# Patient Record
Sex: FEMALE | Race: BLACK OR AFRICAN AMERICAN | NOT HISPANIC OR LATINO | Employment: UNEMPLOYED
[De-identification: names, ages, dates, MRNs, and addresses within clinical notes are randomized per-mention and may not be internally consistent; named-entity substitution may affect disease eponyms.]

---

## 2024-01-12 ENCOUNTER — TRANSCRIBE ORDERS (OUTPATIENT)
Dept: OTHER | Age: 29
End: 2024-01-12

## 2024-01-12 DIAGNOSIS — B19.10 HEPATITIS B INFECTION WITHOUT DELTA AGENT WITHOUT HEPATIC COMA, UNSPECIFIED CHRONICITY: Primary | ICD-10-CM

## 2024-01-17 NOTE — CONFIDENTIAL NOTE
DIAGNOSIS:  Hepatitis B infection without delta agent without hepatic coma, unspecified chronicity    Appt Date:  03.01.2024   NOTES STATUS DETAILS   OFFICE NOTE from referring provider Care Everywhere 01.11.2024  Brandie Broussard CNM [facility]   OFFICE NOTES from other specialists     DISCHARGE SUMMARY from hospital     MEDICATION LIST Care Everywhere    LIVER BIOSPY (IF APPLICABLE)      PATHOLOGY REPORTS      IMAGING     ENDOSCOPY (IF AVAILABLE)     COLONOSCOPY (IF AVAILABLE)     ULTRASOUND LIVER     CT OF ABDOMEN     MRI OF LIVER     FIBROSCAN, US ELASTOGRAPHY, FIBROSIS SCAN, MR ELASTOGRAPHY     LABS     HEPATIC PANEL (LIVER PANEL) Care Everywhere 12.02.2023   BASIC METABOLIC PANEL Care Everywhere 07.06.2023   COMPLETE METABOLIC PANEL Care Everywhere 01.03.2024   COMPLETE BLOOD COUNT (CBC) Care Everywhere 01.03.2024   INTERNATIONAL NORMALIZED RATIO (INR)     HEPATITIS C ANTIBODY Care Everywhere 01.03.2024   HEPATITIS C VIRAL LOAD/PCR     HEPATITIS C GENOTYPE     HEPATITIS B SURFACE ANTIGEN Care Everywhere 01.03.2024   HEPATITIS B SURFACE ANTIBODY Care Everywhere 01.03.2024   HEPATITIS B DNA QUANT LEVEL     HEPATITIS B CORE ANTIBODY Care Everywhere 01.03.2024

## 2024-02-22 DIAGNOSIS — B19.10 HEPATITIS B VIRUS INFECTION, UNSPECIFIED CHRONICITY: Primary | ICD-10-CM

## 2024-03-01 ENCOUNTER — PRE VISIT (OUTPATIENT)
Dept: GASTROENTEROLOGY | Facility: CLINIC | Age: 29
End: 2024-03-01

## 2024-03-01 ENCOUNTER — OFFICE VISIT (OUTPATIENT)
Dept: GASTROENTEROLOGY | Facility: CLINIC | Age: 29
End: 2024-03-01
Attending: PHYSICIAN ASSISTANT
Payer: COMMERCIAL

## 2024-03-01 ENCOUNTER — LAB (OUTPATIENT)
Dept: LAB | Facility: CLINIC | Age: 29
End: 2024-03-01
Attending: PHYSICIAN ASSISTANT
Payer: COMMERCIAL

## 2024-03-01 VITALS
OXYGEN SATURATION: 98 % | SYSTOLIC BLOOD PRESSURE: 113 MMHG | WEIGHT: 151 LBS | DIASTOLIC BLOOD PRESSURE: 77 MMHG | HEART RATE: 77 BPM

## 2024-03-01 DIAGNOSIS — Z71.1 CONCERN ABOUT SKIN DISEASE WITHOUT DIAGNOSIS: ICD-10-CM

## 2024-03-01 DIAGNOSIS — B19.10 HEPATITIS B VIRUS INFECTION, UNSPECIFIED CHRONICITY: ICD-10-CM

## 2024-03-01 DIAGNOSIS — B19.10 HEPATITIS B INFECTION WITHOUT DELTA AGENT WITHOUT HEPATIC COMA, UNSPECIFIED CHRONICITY: Primary | ICD-10-CM

## 2024-03-01 DIAGNOSIS — Z86.19 HISTORY OF HELICOBACTER PYLORI INFECTION: ICD-10-CM

## 2024-03-01 DIAGNOSIS — R63.0 POOR APPETITE: ICD-10-CM

## 2024-03-01 DIAGNOSIS — R10.11 RUQ ABDOMINAL PAIN: ICD-10-CM

## 2024-03-01 DIAGNOSIS — B19.10 HEPATITIS B VIRUS INFECTION, UNSPECIFIED CHRONICITY: Primary | ICD-10-CM

## 2024-03-01 PROBLEM — A04.8 HELICOBACTER PYLORI INFECTION: Status: ACTIVE | Noted: 2024-01-03

## 2024-03-01 PROBLEM — E87.6 LOW SERUM POTASSIUM: Status: ACTIVE | Noted: 2024-01-11

## 2024-03-01 PROBLEM — N28.1 RENAL CYST, RIGHT: Status: ACTIVE | Noted: 2023-07-17

## 2024-03-01 PROBLEM — Z28.39 IMMUNIZATION DEFICIENCY: Status: ACTIVE | Noted: 2024-01-11

## 2024-03-01 PROBLEM — M54.42 CHRONIC BILATERAL LOW BACK PAIN WITH BILATERAL SCIATICA: Status: ACTIVE | Noted: 2024-01-03

## 2024-03-01 PROBLEM — N90.810 FEMALE CIRCUMCISION: Status: ACTIVE | Noted: 2023-07-17

## 2024-03-01 PROBLEM — E55.9 VITAMIN D DEFICIENCY: Status: ACTIVE | Noted: 2024-01-11

## 2024-03-01 PROBLEM — R10.13 EPIGASTRIC PAIN: Status: ACTIVE | Noted: 2024-01-03

## 2024-03-01 PROBLEM — R11.0 NAUSEA: Status: ACTIVE | Noted: 2024-01-11

## 2024-03-01 PROBLEM — M54.41 CHRONIC BILATERAL LOW BACK PAIN WITH BILATERAL SCIATICA: Status: ACTIVE | Noted: 2024-01-03

## 2024-03-01 PROBLEM — E66.3 OVERWEIGHT: Status: ACTIVE | Noted: 2023-07-17

## 2024-03-01 PROBLEM — G89.29 CHRONIC BILATERAL LOW BACK PAIN WITH BILATERAL SCIATICA: Status: ACTIVE | Noted: 2024-01-03

## 2024-03-01 PROBLEM — Z60.5: Status: ACTIVE | Noted: 2023-07-17

## 2024-03-01 PROBLEM — N91.5 LIGHT MENSTRUAL FLOW: Status: ACTIVE | Noted: 2024-01-03

## 2024-03-01 PROBLEM — H70.93 MASTOIDITIS OF BOTH SIDES: Status: ACTIVE | Noted: 2024-01-11

## 2024-03-01 PROBLEM — D22.9 ATYPICAL MOLE: Status: ACTIVE | Noted: 2024-01-03

## 2024-03-01 PROBLEM — G47.00 INSOMNIA: Status: ACTIVE | Noted: 2024-01-11

## 2024-03-01 PROBLEM — R21 RASH: Status: ACTIVE | Noted: 2024-01-03

## 2024-03-01 LAB
ALBUMIN SERPL BCG-MCNC: 3.8 G/DL (ref 3.5–5.2)
ALP SERPL-CCNC: 96 U/L (ref 40–150)
ALT SERPL W P-5'-P-CCNC: 33 U/L (ref 0–50)
ANION GAP SERPL CALCULATED.3IONS-SCNC: 11 MMOL/L (ref 7–15)
AST SERPL W P-5'-P-CCNC: 23 U/L (ref 0–45)
BILIRUB DIRECT SERPL-MCNC: <0.2 MG/DL (ref 0–0.3)
BILIRUB SERPL-MCNC: 0.5 MG/DL
BUN SERPL-MCNC: 6.9 MG/DL (ref 6–20)
CALCIUM SERPL-MCNC: 8.9 MG/DL (ref 8.6–10)
CHLORIDE SERPL-SCNC: 104 MMOL/L (ref 98–107)
CREAT SERPL-MCNC: 0.46 MG/DL (ref 0.51–0.95)
DEPRECATED HCO3 PLAS-SCNC: 23 MMOL/L (ref 22–29)
EGFRCR SERPLBLD CKD-EPI 2021: >90 ML/MIN/1.73M2
ERYTHROCYTE [DISTWIDTH] IN BLOOD BY AUTOMATED COUNT: 11.9 % (ref 10–15)
GLUCOSE SERPL-MCNC: 86 MG/DL (ref 70–99)
HCT VFR BLD AUTO: 36.2 % (ref 35–47)
HGB BLD-MCNC: 12.5 G/DL (ref 11.7–15.7)
INR PPP: 1.06 (ref 0.85–1.15)
MCH RBC QN AUTO: 31 PG (ref 26.5–33)
MCHC RBC AUTO-ENTMCNC: 34.5 G/DL (ref 31.5–36.5)
MCV RBC AUTO: 90 FL (ref 78–100)
PLATELET # BLD AUTO: 338 10E3/UL (ref 150–450)
POTASSIUM SERPL-SCNC: 3.7 MMOL/L (ref 3.4–5.3)
PROT SERPL-MCNC: 7 G/DL (ref 6.4–8.3)
RBC # BLD AUTO: 4.03 10E6/UL (ref 3.8–5.2)
SODIUM SERPL-SCNC: 138 MMOL/L (ref 135–145)
WBC # BLD AUTO: 7 10E3/UL (ref 4–11)

## 2024-03-01 PROCEDURE — 87517 HEPATITIS B DNA QUANT: CPT | Performed by: PHYSICIAN ASSISTANT

## 2024-03-01 PROCEDURE — 87350 HEPATITIS BE AG IA: CPT | Mod: 90 | Performed by: PATHOLOGY

## 2024-03-01 PROCEDURE — 85027 COMPLETE CBC AUTOMATED: CPT | Performed by: PATHOLOGY

## 2024-03-01 PROCEDURE — 86707 HEPATITIS BE ANTIBODY: CPT | Mod: 90 | Performed by: PATHOLOGY

## 2024-03-01 PROCEDURE — 80053 COMPREHEN METABOLIC PANEL: CPT | Performed by: PATHOLOGY

## 2024-03-01 PROCEDURE — 99204 OFFICE O/P NEW MOD 45 MIN: CPT | Performed by: PHYSICIAN ASSISTANT

## 2024-03-01 PROCEDURE — 99000 SPECIMEN HANDLING OFFICE-LAB: CPT | Performed by: PATHOLOGY

## 2024-03-01 PROCEDURE — 85610 PROTHROMBIN TIME: CPT | Performed by: PATHOLOGY

## 2024-03-01 PROCEDURE — G0463 HOSPITAL OUTPT CLINIC VISIT: HCPCS | Performed by: PHYSICIAN ASSISTANT

## 2024-03-01 PROCEDURE — 82248 BILIRUBIN DIRECT: CPT | Performed by: PATHOLOGY

## 2024-03-01 PROCEDURE — 36415 COLL VENOUS BLD VENIPUNCTURE: CPT | Performed by: PATHOLOGY

## 2024-03-01 NOTE — NURSING NOTE
Chief Complaint   Patient presents with    Consult     Hep B     Vital signs:      BP: 113/77 Pulse: 77     SpO2: 98 %       Weight: 68.5 kg (151 lb)  There is no height or weight on file to calculate BMI.      Evon Lamb CMA   3/1/2024 10:58 AM

## 2024-03-01 NOTE — PROGRESS NOTES
Hepatology Clinic Note  Elizabeth Sam   Date of Birth 1995    REASON FOR CONSULTATION: Hepatitis B  REFERRING PROVIDER: Brandie Broussard CNM         Assessment/plan:     Elizabeth Sam is a 30 YO F with chronic hepatitis B, e antigen and e antibody pending.     # Chronic Hepatitis B  HBV Sag: reactive  HBV Cab total: reactive   HBV DNA: 1890 Jan 2024   > Pending today's value   HCV Ab: nonreactive   ALT/AST: 25, 26  Fibrosis Assessment: none per chart review   Liver Function: INR, PLT, Tbili, alk phos, albumin, LFTs all WNL  Diagnosed: 20212022 per pt report   Prior biopsy: none   Prior treatments: never been on HBV meds   Lives alone; not currently sexually active     US abd limited 7/16/23: Liver mildly increased in echogenicity. No masses. No intrahepatic biliary dilatation.     CT abd/pelvis w/o and w/ contrast 8/1/23: No focal suspicious hepatic mass.     - Reviewed recent imaging and labs  - HBV treatment: not indicated at this time with normal LFTs and recent low viremia    - Discussed Hepatitis B transmission, the natural course of Hepatitis B and indications for treatment. We also discussed the importance of regular labs and follow-up.   - Recommended Hepatitis B testing for other members of the household and immunization if necessary as well as any new sexual partners.     - Follow-up in clinic in 6 months w/ labs prior (hepatic panel, CBC, INR, BMP, HBV DNA quant)    > Consider fibroscan at that visit     # Intermittent RUQ pain  # Poor appetite  # Hx H pylori  # Occasional melena (hgb 12.5, BUN WNL)   # Constipation   - Ordered HIDA scan   - Referral placed to General GI   - Concerns that this could be gallbladder related despite recent normal imaging of liver   - Could also consider testing for H pylori +/- diagnostic EGD/colonoscopy     # Skin lesions (small, dark in color, scattered) noted on neck    - Referral placed to Dermatology     Laura Sanchez PA-C   [facility]  "Hepatology  -----------------------------------------------------       HPI:   Elizabeth Sam is a 28 YO F presenting for evaluation and treatment of Hepatitis B.     Patient believes she was diagnosed with hepatitis B 2021/2022.  Has never been on hepatitis B medication.  No history of liver biopsy.  States her mom has a history of hepatitis B.  Patient lives alone.  Is not currently sexually active.    States the only current prescription medication she takes as a sleep aid nightly.  States that was prescribed by a provider at an outside facility.  She is unsure of the name of the medication.  Occasionally takes vitamin D and B12.  States she used to take over-the-counter pain medication as needed from [employer] for headache.    States she has random right-sided abdominal pain that feels sharp.  Pain comes and goes.  When the pain is present, she will have episodes of vomiting associated with burning upper back pain.  Admits that she has a history of H. pylori in the past that was treated with medications.  Patient states the H. pylori infection resolved.  No recent fevers or chills.  No burning in her chest.  No sour taste in her mouth.  States she has a poor appetite.  Typically drinks water or juice.  Believes she is losing weight.  Admits to history of constipation.  No bright red blood per rectum.  Occasionally sees black stool.  Also wonders about dark appearing spots that are on her neck.  Patient states the spots are not itchy or painful.  States they come and go.  This has been happening for several years.    Occasionally smokes hookah that contains tobacco.  Denies history of IV drug use.  No current alcohol or illicit drug use.    PMH:  has no past medical history on file.    SMH:  has no past surgical history on file.     Medications:   No current outpatient medications on file.     No current facility-administered medications for this visit.     Hepatitis B:   No results found for: \"HEPBANG\", \"HBCAB\", " "\"AUSAB\", \"HCVAB\", \"HCQTIINST\", \"HDAAB\"  No results found for: \"HBQRESINST\", \"HBQRES\"  No results found for: \"HBEABY\", \"HBEAGN\"         Medications:     No current outpatient medications on file.     No current facility-administered medications for this visit.          Allergies:   Not on File         Social History:     Social History     Socioeconomic History    Marital status: Single     Spouse name: Not on file    Number of children: Not on file    Years of education: Not on file    Highest education level: Not on file   Occupational History    Not on file   Tobacco Use    Smoking status: Some Days     Types: Hookah    Smokeless tobacco: Not on file   Substance and Sexual Activity    Alcohol use: Not Currently    Drug use: Not Currently    Sexual activity: Not on file   Other Topics Concern    Not on file   Social History Narrative    Not on file     Social Determinants of Health     Financial Resource Strain: Not on file   Food Insecurity: Not on file   Transportation Needs: Not on file   Physical Activity: Not on file   Stress: Not on file   Social Connections: Not on file   Interpersonal Safety: Not on file   Housing Stability: Not on file          Family History:     Family History   Problem Relation Age of Onset    Liver Disease Mother         Hepatitis B    Hypertension Father           Review of Systems:   GEN: See HPI         Physical Exam:     Vital signs:      BP: 113/77 Pulse: 77     SpO2: 98 %       Weight: 68.5 kg (151 lb)  There is no height or weight on file to calculate BMI.  Gen: A&Ox3, NAD, well developed  HEENT: non-icteric   CV: RRR, no overt murmurs  Lung: CTA anteriorly, no wheezing or crackles.   Abd: soft, mild TTP in LLQ and RLQ, ND, no palpable splenomegaly, liver is not palpable.   Ext: no edema, intact pulses.   Skin: Small dark scattered spots noted on neck area   Neuro: grossly intact  Psych: appropriate mood and affects    IMAGING:     ED US ABDOMINAL/GALLBLADDER " 12/3/23    Indications: Abdominal pain    Window: Longitudinal and Transverse    Findings: No gall stones identified, No anterior gallbladder wall  thickening identified, No pericholecystic fluid identified, No CBD  dilation identified, and Negative sonographic Bray's sign    Impression: No sonographic evidence of acute biliary pathology identified    Homer Casillas MD, 12/3/2023 2:28 PM      CT ABDOMEN W/O +W/ + PELVIS W/IV CON 8/1/23    Impression: No suspicious renal mass. Cholelithiasis without CT evidence of acute cholecystitis.    Reading Radiologist: Chavez Logno  Narrative    Comparison: None    Indication: Renal mass    Technique: Volumetric helical acquisition of CT images from the lung bases through the symphysis pubis before and after the administration of intravenous contrast. Renal mass protocol was utilized.    DOSE:    Total DLP = 1454 mGy.cm.      Findings: Mild dependent atelectasis. Normal heart size without pericardial effusion. No pleural effusion. No pneumothorax.    Cholelithiasis without CT evidence of acute cholecystitis. No focal suspicious hepatic mass. The pancreas, spleen, adrenal glands, and kidneys are within normal limits. No nephrolithiasis, hydronephrosis or hydroureter on either side. No suspicious renal mass. No urinary bladder stone. No bowel obstruction. Normal appendix. No free air in the abdomen. No free fluid in the pelvis.    Aorta and major branches are patent without aneurysm or significant stenosis. Portal vein and superior mesenteric vein are patent.    No focal suspicious osseous lesion identified.        US Abdomen Limited Portable 7/16/23    Moderately distended appearing gallbladder without evidence of cholelithiasis or acute cholecystitis. Mildly prominent common bile duct measuring 6.8 millimeters.    Dictated by Scooter Matias MD @ 7/16/2023 8:01:20 AM    For Patients:  As a result of the 21st Century Cures Act, medical imaging exams and procedure reports  are released immediately into your electronic medical record.  You may view this report before your referring provider.  If you have questions, please contact your health care provider.    INDICATION:  Right upper quadrant abdomen pain    TECHNIQUE:  Ultrasound abdomen limited.  Sonographic images of the right upper quadrant were obtained using gray-scale and color Doppler images.    COMPARISON:  None    FINDINGS:  Liver: Mildly increased in echogenicity. No masses.  No intrahepatic biliary dilatation.      Gallbladder: There is a distended appearing gallbladder without evidence of radiopaque calculus, gallbladder wall thickening or pericholecystic fluid. There is no sonographic tenderness..    Common bile duct: 6.8 mm.      Pancreas: Normal.      Right kidney: Normal in size.  Normal echotexture and cortex.  No masses, stones, or hydronephrosis.      Vasculature: Proximal abdominal aorta and IVC are normal.

## 2024-03-01 NOTE — LETTER
3/1/2024         RE: Elizabeth Sam  [address]  [place] [address]        Dear Colleague,    Thank you for referring your patient, Elizabeth Sam, to the [facility] [place]. Please see a copy of my visit note below.    Hepatology Clinic Note  Elizabeth Sam   Date of Birth 1995    REASON FOR CONSULTATION: Hepatitis B  REFERRING PROVIDER: Brandie Broussard CNM         Assessment/plan:     Elizabeth Sam is a 28 YO F with chronic hepatitis B, e antigen and e antibody pending.     # Chronic Hepatitis B  HBV Sag: reactive  HBV Cab total: reactive   HBV DNA: 1890 Jan 2024   > Pending today's value   HCV Ab: nonreactive   ALT/AST: 25, 26  Fibrosis Assessment: none per chart review   Liver Function: INR, PLT, Tbili, alk phos, albumin, LFTs all WNL  Diagnosed: 20212022 per pt report   Prior biopsy: none   Prior treatments: never been on HBV meds   Lives alone; not currently sexually active     US abd limited 7/16/23: Liver mildly increased in echogenicity. No masses. No intrahepatic biliary dilatation.     CT abd/pelvis w/o and w/ contrast 8/1/23: No focal suspicious hepatic mass.     - Reviewed recent imaging and labs  - HBV treatment: not indicated at this time with normal LFTs and recent low viremia    - Discussed Hepatitis B transmission, the natural course of Hepatitis B and indications for treatment. We also discussed the importance of regular labs and follow-up.   - Recommended Hepatitis B testing for other members of the household and immunization if necessary as well as any new sexual partners.     - Follow-up in clinic in 6 months w/ labs prior (hepatic panel, CBC, INR, BMP, HBV DNA quant)    > Consider fibroscan at that visit     # Intermittent RUQ pain  # Poor appetite  # Hx H pylori  # Occasional melena (hgb 12.5, BUN WNL)   # Constipation   - Ordered HIDA scan   - Referral placed to General GI   - Concerns that this could be gallbladder related despite recent  normal imaging of liver   - Could also consider testing for H pylori +/- diagnostic EGD/colonoscopy     # Skin lesions (small, dark in color, scattered) noted on neck    - Referral placed to Dermatology     Laura Sanchez PA-C   [facility]  -----------------------------------------------------       HPI:   Elizabeth Sam is a 30 YO F presenting for evaluation and treatment of Hepatitis B.     Patient believes she was diagnosed with hepatitis B 2021/2022.  Has never been on hepatitis B medication.  No history of liver biopsy.  States her mom has a history of hepatitis B.  Patient lives alone.  Is not currently sexually active.    States the only current prescription medication she takes as a sleep aid nightly.  States that was prescribed by a provider at an outside facility.  She is unsure of the name of the medication.  Occasionally takes vitamin D and B12.  States she used to take over-the-counter pain medication as needed from [facility] for headache.    States she has random right-sided abdominal pain that feels sharp.  Pain comes and goes.  When the pain is present, she will have episodes of vomiting associated with burning upper back pain.  Admits that she has a history of H. pylori in the past that was treated with medications.  Patient states the H. pylori infection resolved.  No recent fevers or chills.  No burning in her chest.  No sour taste in her mouth.  States she has a poor appetite.  Typically drinks water or juice.  Believes she is losing weight.  Admits to history of constipation.  No bright red blood per rectum.  Occasionally sees black stool.  Also wonders about dark appearing spots that are on her neck.  Patient states the spots are not itchy or painful.  States they come and go.  This has been happening for several years.    Occasionally smokes hookah that contains tobacco.  Denies history of IV drug use.  No current alcohol or illicit drug use.    PMH:  has no past  "medical history on file.    SM:  has no past surgical history on file.     Medications:   No current outpatient medications on file.     No current facility-administered medications for this visit.     Hepatitis B:   No results found for: \"HEPBANG\", \"HBCAB\", \"AUSAB\", \"HCVAB\", \"HCQTIINST\", \"HDAAB\"  No results found for: \"HBQRESINST\", \"HBQRES\"  No results found for: \"HBEABY\", \"HBEAGN\"         Medications:     No current outpatient medications on file.     No current facility-administered medications for this visit.          Allergies:   Not on File         Social History:     Social History     Socioeconomic History     Marital status: Single     Spouse name: Not on file     Number of children: Not on file     Years of education: Not on file     Highest education level: Not on file   Occupational History     Not on file   Tobacco Use     Smoking status: Some Days     Types: Hookah     Smokeless tobacco: Not on file   Substance and Sexual Activity     Alcohol use: Not Currently     Drug use: Not Currently     Sexual activity: Not on file   Other Topics Concern     Not on file   Social History Narrative     Not on file     Social Determinants of Health     Financial Resource Strain: Not on file   Food Insecurity: Not on file   Transportation Needs: Not on file   Physical Activity: Not on file   Stress: Not on file   Social Connections: Not on file   Interpersonal Safety: Not on file   Housing Stability: Not on file          Family History:     Family History   Problem Relation Age of Onset     Liver Disease Mother         Hepatitis B     Hypertension Father           Review of Systems:   GEN: See HPI         Physical Exam:     Vital signs:      BP: 113/77 Pulse: 77     SpO2: 98 %       Weight: 68.5 kg (151 lb)  There is no height or weight on file to calculate BMI.  Gen: A&Ox3, NAD, well developed  HEENT: non-icteric   CV: RRR, no overt murmurs  Lung: CTA anteriorly, no wheezing or crackles.   Abd: soft, mild TTP in " LLQ and RLQ, ND, no palpable splenomegaly, liver is not palpable.   Ext: no edema, intact pulses.   Skin: Small dark scattered spots noted on neck area   Neuro: grossly intact  Psych: appropriate mood and affects    IMAGING:     ED US ABDOMINAL/GALLBLADDER 12/3/23    Indications: Abdominal pain    Window: Longitudinal and Transverse    Findings: No gall stones identified, No anterior gallbladder wall  thickening identified, No pericholecystic fluid identified, No CBD  dilation identified, and Negative sonographic Bray's sign    Impression: No sonographic evidence of acute biliary pathology identified    Homer Casillas MD, 12/3/2023 2:28 PM      CT ABDOMEN W/O +W/ + PELVIS W/IV CON 8/1/23    Impression: No suspicious renal mass. Cholelithiasis without CT evidence of acute cholecystitis.    Reading Radiologist: Chavez Longo  Narrative    Comparison: None    Indication: Renal mass    Technique: Volumetric helical acquisition of CT images from the lung bases through the symphysis pubis before and after the administration of intravenous contrast. Renal mass protocol was utilized.    DOSE:    Total DLP = 1454 mGy.cm.      Findings: Mild dependent atelectasis. Normal heart size without pericardial effusion. No pleural effusion. No pneumothorax.    Cholelithiasis without CT evidence of acute cholecystitis. No focal suspicious hepatic mass. The pancreas, spleen, adrenal glands, and kidneys are within normal limits. No nephrolithiasis, hydronephrosis or hydroureter on either side. No suspicious renal mass. No urinary bladder stone. No bowel obstruction. Normal appendix. No free air in the abdomen. No free fluid in the pelvis.    Aorta and major branches are patent without aneurysm or significant stenosis. Portal vein and superior mesenteric vein are patent.    No focal suspicious osseous lesion identified.        US Abdomen Limited Portable 7/16/23    Moderately distended appearing gallbladder without evidence of  cholelithiasis or acute cholecystitis. Mildly prominent common bile duct measuring 6.8 millimeters.    Dictated by Scooter Matias MD @ 7/16/2023 8:01:20 AM    For Patients:  As a result of the 21st Century Cures Act, medical imaging exams and procedure reports are released immediately into your electronic medical record.  You may view this report before your referring provider.  If you have questions, please contact your health care provider.    INDICATION:  Right upper quadrant abdomen pain    TECHNIQUE:  Ultrasound abdomen limited.  Sonographic images of the right upper quadrant were obtained using gray-scale and color Doppler images.    COMPARISON:  None    FINDINGS:  Liver: Mildly increased in echogenicity. No masses.  No intrahepatic biliary dilatation.      Gallbladder: There is a distended appearing gallbladder without evidence of radiopaque calculus, gallbladder wall thickening or pericholecystic fluid. There is no sonographic tenderness..    Common bile duct: 6.8 mm.      Pancreas: Normal.      Right kidney: Normal in size.  Normal echotexture and cortex.  No masses, stones, or hydronephrosis.      Vasculature: Proximal abdominal aorta and IVC are normal.        Again, thank you for allowing me to participate in the care of your patient.        Sincerely,        Laura Sanchez PA-C

## 2024-03-02 LAB
HBV DNA SERPL NAA+PROBE-ACNC: 2880 IU/ML
HBV DNA SERPL NAA+PROBE-LOG IU: 3.5 {LOG_IU}/ML
HBV E AB SERPL QL IA: POSITIVE
HBV E AG SERPL QL IA: NEGATIVE

## 2024-03-04 ENCOUNTER — APPOINTMENT (OUTPATIENT)
Dept: INTERPRETER SERVICES | Facility: CLINIC | Age: 29
End: 2024-03-04
Payer: COMMERCIAL

## 2024-03-05 ENCOUNTER — TELEPHONE (OUTPATIENT)
Dept: GASTROENTEROLOGY | Facility: CLINIC | Age: 29
End: 2024-03-05
Payer: COMMERCIAL

## 2024-03-05 NOTE — TELEPHONE ENCOUNTER
Called patient per Laura Sanchez PA-C regarding lab results.  used.    Informed patient:  Normal LFTs and low virus level. Will continue off hep b med at this time. Will see her back 10/9/24.     Detailed voicemail left.    MIROSLAVA HagerN, RN, PHN  Hepatology Clinic  [facility]  [facility]

## 2024-03-13 ENCOUNTER — TELEPHONE (OUTPATIENT)
Dept: GASTROENTEROLOGY | Facility: CLINIC | Age: 29
End: 2024-03-13
Payer: COMMERCIAL

## 2024-03-13 NOTE — TELEPHONE ENCOUNTER
[facility] Call Center    Phone Message    May a detailed message be left on voicemail: Yes    Reason for Call: Other: Patient is currently scheduled on 4/26, as visit type New GI Urgent. This is outside the expected timeline for this referral. Patient has been added to the waitlist.      Scheduled per pt request at [facility] GI only, first available     Action Taken: Message routed to:  Other: GI REFERRAL TRIAGE POOL     Travel Screening: Not Applicable

## 2024-03-18 NOTE — TELEPHONE ENCOUNTER
Writer and  called and left voice message for Pt. Called Pt to help get them scheduled for a sooner GI appointment. Writer left call back number.

## 2024-03-26 NOTE — TELEPHONE ENCOUNTER
The Pt is now scheduled within the appropriate time frame of one month for urgent triaged referrals. No rescheduling is needed anymore. Closing encounter.

## 2024-04-18 NOTE — TELEPHONE ENCOUNTER
Records Requested     April 18, 2024 3:53 PM  [id number]   Facility  [facility]  Fax: [phone]    [facility]  Fax: [phone]   Outcome Urgent requests faxed to [facility] and [facility] to push images to PACS    4/22/24 12:24 PM - Second requests faxed to [facility] and [facility] to push images to PACS.     4/23/24 8:29 AM - Images received from [facility] and resolved in PACS.     4/25/24 9:00 AM - Images received from [facility] and resolved in PACS.        REFERRAL INFORMATION:  Referring Provider:  Laura Sanchez PA-C  Referring Clinic:  [facility]   Reason for Visit/Diagnosis: R10.11 (ICD-10-CM) - RUQ abdominal pain Z86.19 (ICD-10-CM) - History of Helicobacter pylori infection     FUTURE VISIT INFORMATION:  Appointment Date: 4/26/24  Appointment Time: 11:00 AM      NOTES STATUS DETAILS   OFFICE NOTE from Referring Provider Internal 3/1/24 - Hep OV with Laura Sanchez PA-C   OFFICE NOTE from Other Specialist Internal 1/24/24 -PCC OV with Brandie Broussard CNM at [facility] DISCHARGE SUMMARY/  ED VISITS Care Everywhere 12/2/23 - [facility] ED visit with Elodia Schumacher PA-C    MEDICATION LIST Internal         STOOL TESTING Care Everywhere Ova and Parasite - 1/9/24   PERTINENT LABS Care Everywhere/Internal 3/1/24 - CBCP; BMP; HFP     1/3/24 - H Pylori breath test    IMAGING (CT, MRI, EGD, MRCP, Small Bowel Follow Through/SBT, MR/CT Enterography) PACS [facility]:  US Abdomen - 12/3/23    CT Abdomen Pelvis - 8/1/23    [facility]:  US Abdomen - 7/16/23

## 2024-04-26 ENCOUNTER — PRE VISIT (OUTPATIENT)
Dept: GASTROENTEROLOGY | Facility: CLINIC | Age: 29
End: 2024-04-26

## 2024-07-05 ENCOUNTER — APPOINTMENT (OUTPATIENT)
Dept: CT IMAGING | Facility: CLINIC | Age: 29
End: 2024-07-05
Attending: EMERGENCY MEDICINE
Payer: COMMERCIAL

## 2024-07-05 ENCOUNTER — HOSPITAL ENCOUNTER (OUTPATIENT)
Facility: CLINIC | Age: 29
Setting detail: OBSERVATION
Discharge: HOME | End: 2024-07-07
Attending: EMERGENCY MEDICINE | Admitting: EMERGENCY MEDICINE
Payer: COMMERCIAL

## 2024-07-05 DIAGNOSIS — K59.00 CONSTIPATION, UNSPECIFIED CONSTIPATION TYPE: Primary | ICD-10-CM

## 2024-07-05 DIAGNOSIS — L03.213 PRESEPTAL CELLULITIS: ICD-10-CM

## 2024-07-05 DIAGNOSIS — K80.20 SYMPTOMATIC CHOLELITHIASIS: ICD-10-CM

## 2024-07-05 LAB
ALBUMIN SERPL BCG-MCNC: 3.8 G/DL (ref 3.5–5.2)
ALBUMIN UR-MCNC: 50 MG/DL
ALP SERPL-CCNC: 93 U/L (ref 40–150)
ALT SERPL W P-5'-P-CCNC: 22 U/L (ref 0–50)
ANION GAP SERPL CALCULATED.3IONS-SCNC: 11 MMOL/L (ref 7–15)
APPEARANCE UR: ABNORMAL
AST SERPL W P-5'-P-CCNC: 32 U/L (ref 0–45)
BASOPHILS # BLD AUTO: 0.1 10E3/UL (ref 0–0.2)
BASOPHILS NFR BLD AUTO: 1 %
BILIRUB SERPL-MCNC: 0.2 MG/DL
BILIRUB UR QL STRIP: NEGATIVE
BUN SERPL-MCNC: 8.8 MG/DL (ref 6–20)
CALCIUM SERPL-MCNC: 9.3 MG/DL (ref 8.6–10)
CHLORIDE SERPL-SCNC: 106 MMOL/L (ref 98–107)
COLOR UR AUTO: ABNORMAL
CREAT SERPL-MCNC: 0.99 MG/DL (ref 0.51–0.95)
CRP SERPL-MCNC: 4.69 MG/L
DEPRECATED HCO3 PLAS-SCNC: 24 MMOL/L (ref 22–29)
EGFRCR SERPLBLD CKD-EPI 2021: 79 ML/MIN/1.73M2
EOSINOPHIL # BLD AUTO: 0.1 10E3/UL (ref 0–0.7)
EOSINOPHIL NFR BLD AUTO: 2 %
ERYTHROCYTE [DISTWIDTH] IN BLOOD BY AUTOMATED COUNT: 12.7 % (ref 10–15)
GLUCOSE SERPL-MCNC: 80 MG/DL (ref 70–99)
GLUCOSE UR STRIP-MCNC: NEGATIVE MG/DL
HCG SERPL QL: NEGATIVE
HCT VFR BLD AUTO: 39.2 % (ref 35–47)
HGB BLD-MCNC: 13.1 G/DL (ref 11.7–15.7)
HGB UR QL STRIP: ABNORMAL
IMM GRANULOCYTES # BLD: 0 10E3/UL
IMM GRANULOCYTES NFR BLD: 1 %
INR PPP: 1.03 (ref 0.85–1.15)
KETONES UR STRIP-MCNC: ABNORMAL MG/DL
LACTATE SERPL-SCNC: 1.1 MMOL/L (ref 0.7–2)
LEUKOCYTE ESTERASE UR QL STRIP: ABNORMAL
LIPASE SERPL-CCNC: 39 U/L (ref 13–60)
LYMPHOCYTES # BLD AUTO: 1.7 10E3/UL (ref 0.8–5.3)
LYMPHOCYTES NFR BLD AUTO: 29 %
MCH RBC QN AUTO: 30.5 PG (ref 26.5–33)
MCHC RBC AUTO-ENTMCNC: 33.4 G/DL (ref 31.5–36.5)
MCV RBC AUTO: 91 FL (ref 78–100)
MONOCYTES # BLD AUTO: 0.4 10E3/UL (ref 0–1.3)
MONOCYTES NFR BLD AUTO: 7 %
MUCOUS THREADS #/AREA URNS LPF: PRESENT /LPF
NEUTROPHILS # BLD AUTO: 3.5 10E3/UL (ref 1.6–8.3)
NEUTROPHILS NFR BLD AUTO: 60 %
NITRATE UR QL: NEGATIVE
NRBC # BLD AUTO: 0 10E3/UL
NRBC BLD AUTO-RTO: 0 /100
PH UR STRIP: 6 [PH] (ref 5–7)
PLATELET # BLD AUTO: 305 10E3/UL (ref 150–450)
POTASSIUM SERPL-SCNC: 3.9 MMOL/L (ref 3.4–5.3)
PROT SERPL-MCNC: 7.6 G/DL (ref 6.4–8.3)
RBC # BLD AUTO: 4.3 10E6/UL (ref 3.8–5.2)
RBC URINE: >182 /HPF
SODIUM SERPL-SCNC: 141 MMOL/L (ref 135–145)
SP GR UR STRIP: 1.03 (ref 1–1.03)
UROBILINOGEN UR STRIP-MCNC: NORMAL MG/DL
WBC # BLD AUTO: 5.7 10E3/UL (ref 4–11)
WBC URINE: 3 /HPF

## 2024-07-05 PROCEDURE — 83690 ASSAY OF LIPASE: CPT | Performed by: EMERGENCY MEDICINE

## 2024-07-05 PROCEDURE — 99285 EMERGENCY DEPT VISIT HI MDM: CPT | Mod: 25 | Performed by: EMERGENCY MEDICINE

## 2024-07-05 PROCEDURE — 250N000011 HC RX IP 250 OP 636: Performed by: EMERGENCY MEDICINE

## 2024-07-05 PROCEDURE — 80053 COMPREHEN METABOLIC PANEL: CPT | Performed by: EMERGENCY MEDICINE

## 2024-07-05 PROCEDURE — 83605 ASSAY OF LACTIC ACID: CPT | Performed by: EMERGENCY MEDICINE

## 2024-07-05 PROCEDURE — 70460 CT HEAD/BRAIN W/DYE: CPT | Mod: 26 | Performed by: RADIOLOGY

## 2024-07-05 PROCEDURE — 74177 CT ABD & PELVIS W/CONTRAST: CPT

## 2024-07-05 PROCEDURE — 74177 CT ABD & PELVIS W/CONTRAST: CPT | Mod: 26 | Performed by: RADIOLOGY

## 2024-07-05 PROCEDURE — 70481 CT ORBIT/EAR/FOSSA W/DYE: CPT | Mod: 26 | Performed by: RADIOLOGY

## 2024-07-05 PROCEDURE — 85025 COMPLETE CBC W/AUTO DIFF WBC: CPT | Performed by: EMERGENCY MEDICINE

## 2024-07-05 PROCEDURE — 86140 C-REACTIVE PROTEIN: CPT | Performed by: EMERGENCY MEDICINE

## 2024-07-05 PROCEDURE — 99285 EMERGENCY DEPT VISIT HI MDM: CPT | Performed by: EMERGENCY MEDICINE

## 2024-07-05 PROCEDURE — 85610 PROTHROMBIN TIME: CPT | Performed by: EMERGENCY MEDICINE

## 2024-07-05 PROCEDURE — 70460 CT HEAD/BRAIN W/DYE: CPT

## 2024-07-05 PROCEDURE — 84703 CHORIONIC GONADOTROPIN ASSAY: CPT | Performed by: EMERGENCY MEDICINE

## 2024-07-05 PROCEDURE — 81001 URINALYSIS AUTO W/SCOPE: CPT | Performed by: EMERGENCY MEDICINE

## 2024-07-05 PROCEDURE — 70481 CT ORBIT/EAR/FOSSA W/DYE: CPT

## 2024-07-05 PROCEDURE — 36415 COLL VENOUS BLD VENIPUNCTURE: CPT | Performed by: EMERGENCY MEDICINE

## 2024-07-05 RX ORDER — IOPAMIDOL 755 MG/ML
75 INJECTION, SOLUTION INTRAVASCULAR ONCE
Status: COMPLETED | OUTPATIENT
Start: 2024-07-05 | End: 2024-07-05

## 2024-07-05 RX ADMIN — IOPAMIDOL 75 ML: 755 INJECTION, SOLUTION INTRAVENOUS at 23:39

## 2024-07-05 ASSESSMENT — ACTIVITIES OF DAILY LIVING (ADL)
ADLS_ACUITY_SCORE: 35

## 2024-07-05 ASSESSMENT — COLUMBIA-SUICIDE SEVERITY RATING SCALE - C-SSRS
6. HAVE YOU EVER DONE ANYTHING, STARTED TO DO ANYTHING, OR PREPARED TO DO ANYTHING TO END YOUR LIFE?: NO
2. HAVE YOU ACTUALLY HAD ANY THOUGHTS OF KILLING YOURSELF IN THE PAST MONTH?: NO
1. IN THE PAST MONTH, HAVE YOU WISHED YOU WERE DEAD OR WISHED YOU COULD GO TO SLEEP AND NOT WAKE UP?: NO

## 2024-07-06 ENCOUNTER — APPOINTMENT (OUTPATIENT)
Dept: ULTRASOUND IMAGING | Facility: CLINIC | Age: 29
End: 2024-07-06
Attending: EMERGENCY MEDICINE
Payer: COMMERCIAL

## 2024-07-06 PROBLEM — K80.20 SYMPTOMATIC CHOLELITHIASIS: Status: ACTIVE | Noted: 2024-07-06

## 2024-07-06 PROBLEM — L03.213 PRESEPTAL CELLULITIS: Status: ACTIVE | Noted: 2024-07-06

## 2024-07-06 LAB
ERYTHROCYTE [DISTWIDTH] IN BLOOD BY AUTOMATED COUNT: 12.6 % (ref 10–15)
HCT VFR BLD AUTO: 36 % (ref 35–47)
HGB BLD-MCNC: 12.2 G/DL (ref 11.7–15.7)
HOLD SPECIMEN: NORMAL
MCH RBC QN AUTO: 30.7 PG (ref 26.5–33)
MCHC RBC AUTO-ENTMCNC: 33.9 G/DL (ref 31.5–36.5)
MCV RBC AUTO: 91 FL (ref 78–100)
MRSA DNA SPEC QL NAA+PROBE: POSITIVE
PLATELET # BLD AUTO: 283 10E3/UL (ref 150–450)
RBC # BLD AUTO: 3.98 10E6/UL (ref 3.8–5.2)
SA TARGET DNA: POSITIVE
SARS-COV-2 RNA RESP QL NAA+PROBE: NEGATIVE
WBC # BLD AUTO: 5.7 10E3/UL (ref 4–11)

## 2024-07-06 PROCEDURE — 258N000003 HC RX IP 258 OP 636: Performed by: EMERGENCY MEDICINE

## 2024-07-06 PROCEDURE — 250N000013 HC RX MED GY IP 250 OP 250 PS 637

## 2024-07-06 PROCEDURE — 96365 THER/PROPH/DIAG IV INF INIT: CPT

## 2024-07-06 PROCEDURE — 99207 PR APP CREDIT; MD BILLING SHARED VISIT: CPT | Mod: FS

## 2024-07-06 PROCEDURE — 99207 PR APP CREDIT; MD BILLING SHARED VISIT: CPT | Mod: FS | Performed by: STUDENT IN AN ORGANIZED HEALTH CARE EDUCATION/TRAINING PROGRAM

## 2024-07-06 PROCEDURE — G0378 HOSPITAL OBSERVATION PER HR: HCPCS

## 2024-07-06 PROCEDURE — 96375 TX/PRO/DX INJ NEW DRUG ADDON: CPT

## 2024-07-06 PROCEDURE — 85027 COMPLETE CBC AUTOMATED: CPT

## 2024-07-06 PROCEDURE — 87640 STAPH A DNA AMP PROBE: CPT | Mod: XU

## 2024-07-06 PROCEDURE — 36415 COLL VENOUS BLD VENIPUNCTURE: CPT

## 2024-07-06 PROCEDURE — 999N000248 HC STATISTIC IV INSERT WITH US BY RN

## 2024-07-06 PROCEDURE — 87635 SARS-COV-2 COVID-19 AMP PRB: CPT

## 2024-07-06 PROCEDURE — 96376 TX/PRO/DX INJ SAME DRUG ADON: CPT

## 2024-07-06 PROCEDURE — 87641 MR-STAPH DNA AMP PROBE: CPT

## 2024-07-06 PROCEDURE — 87186 SC STD MICRODIL/AGAR DIL: CPT

## 2024-07-06 PROCEDURE — 76705 ECHO EXAM OF ABDOMEN: CPT | Mod: 26 | Performed by: RADIOLOGY

## 2024-07-06 PROCEDURE — 96366 THER/PROPH/DIAG IV INF ADDON: CPT

## 2024-07-06 PROCEDURE — 999N000128 HC STATISTIC PERIPHERAL IV START W/O US GUIDANCE

## 2024-07-06 PROCEDURE — 250N000011 HC RX IP 250 OP 636: Performed by: EMERGENCY MEDICINE

## 2024-07-06 PROCEDURE — 76705 ECHO EXAM OF ABDOMEN: CPT

## 2024-07-06 RX ORDER — ACETAMINOPHEN 325 MG/1
650 TABLET ORAL EVERY 4 HOURS PRN
Status: DISCONTINUED | OUTPATIENT
Start: 2024-07-06 | End: 2024-07-07 | Stop reason: HOSPADM

## 2024-07-06 RX ORDER — POLYETHYLENE GLYCOL 3350 17 G/17G
17 POWDER, FOR SOLUTION ORAL DAILY
Status: DISCONTINUED | OUTPATIENT
Start: 2024-07-06 | End: 2024-07-06

## 2024-07-06 RX ORDER — POLYETHYLENE GLYCOL 3350 17 G/17G
17 POWDER, FOR SOLUTION ORAL DAILY
Status: DISCONTINUED | OUTPATIENT
Start: 2024-07-06 | End: 2024-07-07 | Stop reason: HOSPADM

## 2024-07-06 RX ORDER — VANCOMYCIN HYDROCHLORIDE 1 G/200ML
1000 INJECTION, SOLUTION INTRAVENOUS EVERY 12 HOURS
Status: DISCONTINUED | OUTPATIENT
Start: 2024-07-06 | End: 2024-07-07

## 2024-07-06 RX ORDER — CALCIUM CARBONATE 500 MG/1
1000 TABLET, CHEWABLE ORAL 4 TIMES DAILY PRN
Status: DISCONTINUED | OUTPATIENT
Start: 2024-07-06 | End: 2024-07-07 | Stop reason: HOSPADM

## 2024-07-06 RX ORDER — AMOXICILLIN 250 MG
1 CAPSULE ORAL 2 TIMES DAILY PRN
Status: DISCONTINUED | OUTPATIENT
Start: 2024-07-06 | End: 2024-07-07 | Stop reason: HOSPADM

## 2024-07-06 RX ORDER — LIDOCAINE 40 MG/G
CREAM TOPICAL
Status: DISCONTINUED | OUTPATIENT
Start: 2024-07-06 | End: 2024-07-07 | Stop reason: HOSPADM

## 2024-07-06 RX ORDER — CEFTRIAXONE 1 G/1
1 INJECTION, POWDER, FOR SOLUTION INTRAMUSCULAR; INTRAVENOUS EVERY 24 HOURS
Status: DISCONTINUED | OUTPATIENT
Start: 2024-07-06 | End: 2024-07-07

## 2024-07-06 RX ORDER — ACETAMINOPHEN 650 MG/1
650 SUPPOSITORY RECTAL EVERY 4 HOURS PRN
Status: DISCONTINUED | OUTPATIENT
Start: 2024-07-06 | End: 2024-07-07 | Stop reason: HOSPADM

## 2024-07-06 RX ORDER — AMOXICILLIN 250 MG
2 CAPSULE ORAL 2 TIMES DAILY PRN
Status: DISCONTINUED | OUTPATIENT
Start: 2024-07-06 | End: 2024-07-07 | Stop reason: HOSPADM

## 2024-07-06 RX ORDER — CEFTRIAXONE 1 G/1
1 INJECTION, POWDER, FOR SOLUTION INTRAMUSCULAR; INTRAVENOUS ONCE
Status: COMPLETED | OUTPATIENT
Start: 2024-07-06 | End: 2024-07-06

## 2024-07-06 RX ADMIN — VANCOMYCIN HYDROCHLORIDE 1500 MG: 10 INJECTION, POWDER, LYOPHILIZED, FOR SOLUTION INTRAVENOUS at 04:45

## 2024-07-06 RX ADMIN — VANCOMYCIN HYDROCHLORIDE 1000 MG: 1 INJECTION, SOLUTION INTRAVENOUS at 21:37

## 2024-07-06 RX ADMIN — CEFTRIAXONE SODIUM 1 G: 1 INJECTION, POWDER, FOR SOLUTION INTRAMUSCULAR; INTRAVENOUS at 02:45

## 2024-07-06 RX ADMIN — POLYETHYLENE GLYCOL 3350 17 G: 17 POWDER, FOR SOLUTION ORAL at 06:03

## 2024-07-06 ASSESSMENT — ACTIVITIES OF DAILY LIVING (ADL)
ADLS_ACUITY_SCORE: 39
ADLS_ACUITY_SCORE: 35
ADLS_ACUITY_SCORE: 33
ADLS_ACUITY_SCORE: 35
ADLS_ACUITY_SCORE: 33
ADLS_ACUITY_SCORE: 37
ADLS_ACUITY_SCORE: 33
ADLS_ACUITY_SCORE: 39
ADLS_ACUITY_SCORE: 37
ADLS_ACUITY_SCORE: 33
ADLS_ACUITY_SCORE: 39
ADLS_ACUITY_SCORE: 37
ADLS_ACUITY_SCORE: 35
ADLS_ACUITY_SCORE: 33
ADLS_ACUITY_SCORE: 37
ADLS_ACUITY_SCORE: 39
ADLS_ACUITY_SCORE: 33
ADLS_ACUITY_SCORE: 39
ADLS_ACUITY_SCORE: 35
ADLS_ACUITY_SCORE: 33
ADLS_ACUITY_SCORE: 33
ADLS_ACUITY_SCORE: 35
ADLS_ACUITY_SCORE: 39

## 2024-07-06 NOTE — ED PROVIDER NOTES
[place] EMERGENCY DEPARTMENT ([facility])    7/05/24       ED PROVIDER NOTE       History   No chief complaint on file.    The history is provided by the patient and medical records. The history is limited by a language barrier. A  was used.     Elizabeth Sam is a 29 year old female who has a PMH notable for prior mastoiditis (bilateral), hep B, prior H. pylori, and insomnia, et al., who has had no prior abdominal surgeries, presenting today w/ a 2-week hx of head/face pain, swelling, skin changes, 2-3 days of subjective fevers, and a 1-day history of abdominal pain.     Patient reports having 2 weeks of progressive head discomfort near particular near her right eye, but to some degree around the left eye with some skin changes, swelling and generally not feeling well.  Had some on the left with the improved and she points towards her left eyebrow where there appears to be perhaps a little bit of scabbing release dry skin, and then points around her right eye and reports it's becoming more swollen under the right eyebrow.  No ann eye fall/globe pain (though did report eyeball pain later in ED course), no vision changes (uses glasses chronically, but no change in baseline needs, no loss of vision, flashes, floaters, etc.).  Has some pain around the area of these changes in by the eyes and across her forehead, but otherwise no head pain, no neck discomfort.  No photophobia.  No hearing changes or ear symptoms.  Discomfort is located around the eyes in the periorbital area, forehead, sides of the face, but not back by the mastoid area or posteriorly.      No chest discomfort no shortness of breath. No sore throat or trouble swallowing.  She has been afebrile for most of these 2 weeks with the symptoms but have been like that slightly variable over the course of 2 weeks, now worse on the right as mentioned.  Then in the last couple of days she has had some subjective fevers.  In the  last day she is also had worsening abdominal pain, located throughout the whole of the abdomen, worse in the low abdomen.  Perhaps some constipation, no blood, no urinary symptoms.  She is currently on her menstrual period, she does not have a  and denies chance of pregnancy.  Has had some nausea but no vomiting.  The subjective fevers are more closely time to this abdominal discomfort, has not felt like this previously.  The vomiting she had was not bloody. No traumas or falls, no syncope or near syncope.  No recent travel, no exposure to anybody has recently traveled and no known ill contacts.  She denies having any previous abdominal surgeries. No other new symptoms or complaints this time.  Full ROS completed without any additional findings.     This part of the medical record was transcribed by Елена Doe Medical Scribe, from a dictation done by Anabelle Mantilla MD.      Past Medical History  PMH: Mastoiditis, Hep B, prior H. Pylori, insomnia  No past surgical history on file.  No current outpatient medications on file.    No Known Allergies  Family History  Family History   Problem Relation Age of Onset    Liver Disease Mother         Hepatitis B    Hypertension Father      Social History   Social History     Tobacco Use    Smoking status: Some Days     Types: Hookah   Substance Use Topics    Alcohol use: Not Currently    Drug use: Not Currently      Past medical history, past surgical history, medications, allergies, family history, and social history were reviewed with the patient. No additional pertinent items.   A complete review of systems was performed with pertinent positives and negatives noted in the HPI, and all other systems negative.    Physical Exam   BP: 103/71  Pulse: 65  Temp: 97.8  F (36.6  C)  Resp: 16  SpO2: 98 %  Physical Exam  CONSTITUTIONAL: Awake and alert. Non-toxic appearance. No acute distress.   HENT:   - Head: Head does have some erythema and swelling in the right  periorbital area, particularly at the lateral right eyebrow.  No obvious mastoid findings.  - Ears: External ear grossly normal.   - Nose: Nose normal. No rhinorrhea. No epistaxis.   - Mouth/Throat: MMM  EYES: Conjunctivae and lids are normal. No scleral icterus.  Eyes are noninjected.  PERRL.  EOMI without obvious discomfort.  Vision baseline.  NECK: Normal range of motion and phonation normal. Neck supple.  No tracheal deviation, no stridor. No edema or erythema noted. No obvious meningeal type findings.  CARDIOVASCULAR: Normal rate, regular rhythm and no appreciable abnormal heart sounds.  PULMONARY/CHEST: Normal work of breathing. No accessory muscle usage or stridor. No respiratory distress.  No appreciable abnormal breath sounds.  ABDOMEN: Diffuse abdominal discomfort to palpation, worse in the lower abdomen where she seems quite tender to palpation.  No palpable masses or abnormal pulsatility appreciated.  MUSCULOSKELETAL: Extremities warm and seemingly well perfused.  No obvious focal deficits/abnormalities.  NEUROLOGIC: Awake, alert. Not disoriented. No seizure activity. GCS 15.  No obvious focal strength or sensory abnormalities appreciated.  SKIN: Skin is warm and dry. No diaphoresis. No pallor. No rash/acute appearing skin changes noted.  PSYCHIATRIC: Normal mood and affect. Speech and behavior normal. Thought processes linear. Cognition and memory are normal. No SI/HI reported.     ED Course, Procedures, & Data     ED Course as of 07/06/24 0456   Fri Jul 05, 2024   2246 RBC Urine(!): >182  Patient is on menstrual period, so could be hematuria, vs contamination of blood into urine from vaginal bleeding/menstrual blood   Sat Jul 06, 2024   0041 Reviewed findings with the patient.  Confirm with her that she actually is still having some eye pain despite no obvious orbital involvement on the CT scans.  That said, in my review of literature they would recommend treating as though it could be some early  orbital cellulitis and treating with vancomycin/ceftriaxone.  I discussed this with the patient as well as her gallstones and recommended admission.  She was in agreement with this, however she is currently on her menstrual period and would like to go home and get products for which she is comfortable as opposed to ours, needs to give a key to someone who is unable to come here to get it.  She reportedly lives 5 minutes away and would like to discharge briefly to do so and then states that she will immediately come back to continue her cares.  Reviewed potential risks of leaving, and she does agree to be admitted.  She understands that the IV will need to be removed and then a new one placed upon her return.  She is in agreement with this as well.  She is awake, alert, no findings for clinical intoxication, normal mentation, expressed understanding of risks and recommendations.  Appears to have decision-making capacity at this time.       Critical care was not performed.     Medical Decision Making  The patient's presentation was of moderate complexity (an acute illness with systemic symptoms).    The patient's evaluation involved:  review of 3+ test result(s) ordered prior to this encounter (see separate area of note for details)  ordering and/or review of 3+ test(s) in this encounter (see separate area of note for details)  discussion of management or test interpretation with another health professional (see separate area of note for details)    The patient's management necessitated moderate risk (prescription drug management including medications given in the ED), high risk (a decision regarding hospitalization), and further care after sign-out to admitting IM team and overnight EM physician (see their note for further management).    Assessment & Plan    IMPRESSION:   29 year old Taiwanese speaking female (formal  used for ED encounter) w/ PMH notable for prior mastoiditis (bilateral), hep B, prior H.  "pylori, and insomnia, et al., who has had no prior abdominal surgeries, presenting today w/ a 2-week hx of head/face pain, swelling, skin changes, 2-3 days of subjective fevers, and a 1-day history of abdominal pain.     Clinically, patient appears nontoxic, NAD.  Vitals grossly WNL.  Otherwise on examination does have the right face/periorbital swelling and erythema without obvious mastoid involvement, does not appear to have clear ocular involvement but given the degree of discomfort in the area will evaluate further for orbital cellulitis, no obvious mastoid findings and no obvious meningeal type findings otherwise also has diffuse abdominal discomfort which is worse in the low abdomen    With regards to the head and the face, seems like there could be a cellulitis, potential small abscess or phlegmon, periorbital cellulitis, less likely orbital cellulitis or intracranial process.  No obvious findings for mastoiditis or meningitis/encephalitis    With regards to the patient's abdomen, seemingly unrelated to the facial symptoms, could have appendicitis, UTI, enteritis, colitis, constipation, amongst others.      PLAN:   - Labs, urine studies, abdominal imaging  - Risks/benefits of pursuing imaging reviewed and accepted.   - Symptom management as needed  - Dispo pending ED course    RESULTS:  Labs:   - No obvious emergent findings.   Urine:     Imaging: Written preliminary reports reviewed:  - CT Head / Orbits:   \"HEAD CT:  No acute intracranial process.  ORBITS CT:  1.  Mild right periorbital and left supraorbital edema.  2.  No abscess.  3.  Normal post septal orbits.'  - CT A/P: \"1.  Cholelithiais.  2. Normal appendix\"  Results/reports reviewed w/ patient who expresses understanding of findings and F/U recommendations.    INTERVENTIONS:   - IV Ceftriaxone, IV Vanc (as recommended by UpTo[employer])  --- On imaging looks like just preseptal/periorbital cellulitis at this time, however pt having worsening pain at the " eyes and if chance for potential orbital involvement literature is recommending inpatient treatment and abx coverage (they recommend vanc + ceftriaxone) that would cover orbital cellullitis even if preseptal/periorbital,     RE-EVALUATION:  - Compared to when she first arrived, she is complaining of some more eye/globe discomfort.  No vision changes.   - Patient otherwise continues to do well here in the ED, no acute issues or apparent concerning changes in vitals or clinical appearance.    DISCUSSIONS:  - w/ IM: Reviewed patient/presentation, current state of workup/any pending studies. They will admit for further evaluation/management, F/U pending studies as needed, coordinate w/ consulting services as needed. No additional requests/recommendations for workup/management for in the ED at this time.   - w/ Patient: I have reviewed the available findings, plan with the patient. She expressed understanding and agreement with this plan. All questions answered to the best of our ability at this time.       DISPOSITION/PLANNING:  IMPRESSION:   - Periorbital/pre-septal cellulitis w/ worsening eye pain, no findings for abscess  - Cholelithiasis  - Hematuria (but likely contamination from menstrual bleeding)  - Pt reported constipation  DISPOSITION:  - IM Obs admission   PENDING:   - Abdominal US  OTHER RECOMMENDATIONS:   - F/U pending US  - Gen Surg consult in AM for symptomatic cholelithiasis  - Continue Abx, transition to oral as able - Specialty consult as needed if worsening      ______________________________________________________________________            Anabelle Mantilla MD.  [facility] EMERGENCY DEPARTMENT  7/5/2024     Anabelle Mantilla MD  07/06/24 3220

## 2024-07-06 NOTE — PHARMACY-VANCOMYCIN DOSING SERVICE
Pharmacy Vancomycin Initial Note  Date of Service 2024  Patient's  1995  29 year old, female    Indication: Skin and Soft Tissue Infection    Current estimated CrCl = Estimated Creatinine Clearance: 90.7 mL/min (A) (based on SCr of 0.99 mg/dL (H)).    Creatinine for last 3 days  2024:  8:58 PM Creatinine 0.99 mg/dL    Recent Vancomycin Level(s) for last 3 days  No results found for requested labs within last 3 days.      Vancomycin IV Administrations (past 72 hours)        No vancomycin orders with administrations in past 72 hours.                    Nephrotoxins and other renal medications (From now, onward)      Start     Dose/Rate Route Frequency Ordered Stop    24 1500  vancomycin (VANCOCIN) 1,000 mg in 200 mL dextrose intermittent infusion         1,000 mg  200 mL/hr over 1 Hours Intravenous EVERY 12 HOURS 24 0157      24 0300  vancomycin (VANCOCIN) 1,500 mg in 0.9% NaCl 250 mL intermittent infusion         1,500 mg  over 90 Minutes Intravenous ONCE 24 0157              Contrast Orders - past 72 hours (72h ago, onward)      Start     Dose/Rate Route Frequency Stop    24 2330  iopamidol (ISOVUE-370) solution 75 mL         75 mL Intravenous ONCE 24 2339            [employer] Prediction of Planned Initial Vancomycin Regimen  Loading dose: 1500 mg at 03:00 2024.  Regimen: 1000 mg IV every 12 hours.  Start time: 15:00 on 2024  Exposure target: AUC24 (range)400-600 mg/L.hr   AUC24,ss: 549 mg/L.hr  Probability of AUC24 > 400: 81 %  Ctrough,ss: 17.1 mg/L  Probability of Ctrough,ss > 20: 37 %  Probability of nephrotoxicity (Lodise CORINA ): 13 %          Plan:  Load vancomycin 1500mg IV once now  Followed by vancomycin  1000 mg IV q12h.   Vancomycin monitoring method: AUC  Vancomycin therapeutic monitoring goal: 400-600 mg*h/L  Pharmacy will check vancomycin levels as appropriate in 1-3 Days.    Serum creatinine levels will be ordered daily for the first  week of therapy and at least twice weekly for subsequent weeks.      Tenzin Moreira, [facility]  [id number]

## 2024-07-06 NOTE — H&P
[facility]    History and Physical - Medicine Service, [facility]QU[facility] TEAM        Date of Admission:  7/5/2024    Assessment & Plan      Elizabeth Sam is a 29 year old female admitted on 7/5/2024. She has no significant past medical history and is admitted for treatment of uncomplicated pre-septal cellulitis    Preseptal Cellulitis  Red, painful, and warm furcuncle on the right lateral forehead increasing in size and pain for 2 weeks. No known injury to the area. There are no red flag signs for septal/orbital cellulitis and no systemic signs of infection. Reasonable to treat with IV antibiotics on admission, but would favor transitioning to PO as soon as possible  - admit to inpatient  - started on IV CTX and vanco in the ED, transition to PO per day team  - regular diet    Gall stones  Component of her abdominal pain does certainly sound like biliary colic with right sided pain radiating to her back after eating oily foods. She has not noticed this in the past few weeks and her abdominal pain on admission is specifically not located in this area. CT on admission did show calcified stones in her gall bladder with US showing gall stones at the neck, but without signs of cholecystitis.   - oral diet as tolerated  - continue to monitor    Constipation  Component of her abdominal pain likely related to self reported constipation. Requested oral drink to help with this  - scheduled PEG daily, recommend she continue this on discharge          Diet: Regular Diet Adult  DVT Prophylaxis: Low Risk/Ambulatory with no VTE prophylaxis indicated  Manzo Catheter: Not present  Fluids: PO  Lines: None     Cardiac Monitoring: None  Code Status: Full Code      Clinically Significant Risk Factors Present on Admission                                         Disposition Plan      Expected Discharge Date: 07/07/2024                  Malick Esquivel MD  Medicine Service, [facility] TEAM   [facility]  [facility]  Securely message with [employer] (more info)  Text page via [facility] Paging/Directory   See signed in provider for up to date coverage information  ______________________________________________________________________    Chief Complaint   Facial pain and abdominal pain    History is obtained from the patient through a [employer] Phone  with the patient's male cousin also in the room    History of Present Illness   Elizabeth Sam is a 29 year old female who has no significant past medical history and is admitted for facial pain and abdominal pain    Reports that about 2 weeks ago she developed a pimple on her right lateral forehead. It has continued to grow in size, pain, and redness since then. She noticed a slight fever about 3 days prior, but no chills. No vision changes, no pain with movement of her eyes or neck, and no headaches.     She reports two different types of abdominal pain. One is intermittent, central pain that feels like bloating. She reports being constipated with last BM 2 days PTA not on any home meds. The other distinct abdominal pain is located in the RUQ and she notices sharp pain radiating to her back after eating foods containing a lot of oil. She has not noticed this type of pain recently because she has not been eating much in the past 2 weeks. She is currently on her menstrual period, but reports the pain she describes above is not associated with her cycle.       Past Medical History    No past medical history on file.  Denies medical history    Past Surgical History   No past surgical history on file.  No abdominal surgeries    Prior to Admission Medications   None      Not on any daily medications       Physical Exam   Vital Signs: Temp: 98.1  F (36.7  C) Temp src: Oral BP: 125/87 Pulse: 80   Resp: 14 SpO2: 100 % O2 Device: None (Room air)    Weight: 0 lbs 0 oz    Gen: Pleasant. No distress.    HEENT: MMM. EOMi, PERRL. Red, raised, and  painful furuncle noted lateral to her right eyebrow with surrounding warmth. Another smaller and less painful furcuncle within the left eyebrow.   Neck: No overt asymmetry. Normal ROM  CV: Appears well-perfused. RRR. No murmur.   Resp: Breathing comfortably on room air. No audible wheezing.   Abd: Non-distended, tenderness only to deep palpation throughout abdomen. Negative zacarias's sign  Ext: No edema or overt asymmetry/deformity.   Skin: No overt rash on easily visualized skin.   Neuro: Moving all extremities, alert and oriented  Psych: Calm.       Medical Decision Making       Please see A&P for additional details of medical decision making.      Data     I have personally reviewed the following data over the past 24 hrs:    5.7  \   13.1   / 305     141 106 8.8 /  80   3.9 24 0.99 (H) \     ALT: 22 AST: 32 AP: 93 TBILI: 0.2   ALB: 3.8 TOT PROTEIN: 7.6 LIPASE: 39     Procal: N/A CRP: 4.69 Lactic Acid: 1.1       INR:  1.03 PTT:  N/A   D-dimer:  N/A Fibrinogen:  N/A       Imaging results reviewed over the past 24 hrs:   Recent Results (from the past 24 hour(s))   CT Abdomen Pelvis w Contrast    Narrative    EXAM: CT ABDOMEN PELVIS W CONTRAST  LOCATION: [facility]  DATE: 7/5/2024    INDICATION: abd pain (somewhat diffuse, but worse in low abd), ? appy or other emergent cause  COMPARISON: None.  TECHNIQUE: CT scan of the abdomen and pelvis was performed following injection of IV contrast. Multiplanar reformats were obtained. Dose reduction techniques were used.  CONTRAST: 75cc Isovue 370    FINDINGS:   LOWER CHEST: Normal.    HEPATOBILIARY: Normal contour with no significant mass. No bile duct dilatation. Calcified gallstones.    PANCREAS: No significant mass, duct dilatation, or inflammatory change.    SPLEEN: Normal size.    ADRENAL GLANDS: No significant nodules.    KIDNEYS/BLADDER: No significant mass, stone, or hydronephrosis.    BOWEL: Normal with no obstruction or  acute inflammatory change. Nothing for appendicitis.    LYMPH NODES: No lymphadenopathy.    VASCULATURE: No abdominal aortic aneurysm.    PELVIC ORGANS: No pelvic masses.    MUSCULOSKELETAL: Unremarkable.      Impression    IMPRESSION:   1.  Cholelithiasis  2.  Normal appendix   CT Orbits w Contrast    Narrative    EXAM: CT HEAD W CONTRAST, CT ORBITS W CONTRAST  LOCATION: [facility]  DATE: 7/5/2024    INDICATION: facial infections findings, pain, periorbital swelling  COMPARISON: None.  TECHNIQUE:   1) Routine CT Head without IV contrast. Multiplanar reformats. Dose reduction techniques were used.  2) Routine CT Facial Bones without IV contrast. Multiplanar reformats. Dose reduction techniques were used.    FINDINGS:  HEAD CT:   INTRACRANIAL CONTENTS: No intracranial hemorrhage, extraaxial collection, or mass effect.  No CT evidence of acute infarct. Normal parenchymal density for age. The ventricles and sulci are normal for age.     OSSEOUS STRUCTURES/SOFT TISSUES: No significant abnormality.     FACIAL BONE CT:  OSSEOUS STRUCTURES/SOFT TISSUES: No abscess. There is mild right periorbital edema which is preseptal. Slight left supraorbital edema which is preseptal. No facial bone fracture or malalignment. No evidence for dental trauma or periapical abscess.    ORBITAL CONTENTS: Normal post septal orbits.    SINUSES: No paranasal sinus mucosal disease.      Impression    IMPRESSION:  HEAD CT:  No acute intracranial process.    ORBITS CT:  1.  Mild right periorbital and left supraorbital edema.  2.  No abscess.  3.  Normal post septal orbits.     CT Head w Contrast    Narrative    EXAM: CT HEAD W CONTRAST, CT ORBITS W CONTRAST  LOCATION: [facility]  DATE: 7/5/2024    INDICATION: facial infections findings, pain, periorbital swelling  COMPARISON: None.  TECHNIQUE:   1) Routine CT Head without IV contrast. Multiplanar reformats.  Dose reduction techniques were used.  2) Routine CT Facial Bones without IV contrast. Multiplanar reformats. Dose reduction techniques were used.    FINDINGS:  HEAD CT:   INTRACRANIAL CONTENTS: No intracranial hemorrhage, extraaxial collection, or mass effect.  No CT evidence of acute infarct. Normal parenchymal density for age. The ventricles and sulci are normal for age.     OSSEOUS STRUCTURES/SOFT TISSUES: No significant abnormality.     FACIAL BONE CT:  OSSEOUS STRUCTURES/SOFT TISSUES: No abscess. There is mild right periorbital edema which is preseptal. Slight left supraorbital edema which is preseptal. No facial bone fracture or malalignment. No evidence for dental trauma or periapical abscess.    ORBITAL CONTENTS: Normal post septal orbits.    SINUSES: No paranasal sinus mucosal disease.      Impression    IMPRESSION:  HEAD CT:  No acute intracranial process.    ORBITS CT:  1.  Mild right periorbital and left supraorbital edema.  2.  No abscess.  3.  Normal post septal orbits.     US Abdomen Limited (RUQ)    Impression    RESIDENT PRELIMINARY INTERPRETATION  IMPRESSION: Cholelithiasis without ultrasonographic evidence of acute  cholecystitis. Of note, gallstones are present at the gallbladder  neck. No choledocholithiasis identified. Normal sonographic appearance  of the liver.

## 2024-07-06 NOTE — PLAN OF CARE
"Observation Goal Outcome Evaluation    -Diagnostic tests and consults completed and resulted: In progress  -Vital signs normal or at patient baseline: Met   -Tolerating oral antibiotics or has plans for home infusion setup: In progress   -Infection is improving: In progress    /87   Pulse 80   Temp 98.1  F (36.7  C) (Oral)   Resp 14   Ht 1.727 m (5' 8\")   SpO2 100%   BMI 22.96 kg/m       "

## 2024-07-06 NOTE — PLAN OF CARE
Observation Goal Outcome Evaluation     -Diagnostic tests and consults completed and resulted: Met  -Vital signs normal or at patient baseline: Met   -Tolerating oral antibiotics or has plans for home infusion setup: In progress   -Infection is improving: In progress

## 2024-07-06 NOTE — ED TRIAGE NOTES
Translation provided via  line: Feeling sick, stomach hurts and rash around eyes that began around 2 weeks ago. She has been having cramps. States she has not been able to sleep well due to pain.

## 2024-07-06 NOTE — PLAN OF CARE
"Observation Goal Outcome Evaluation     -Diagnostic tests and consults completed and resulted: Met  -Vital signs normal or at patient baseline: Met   -Tolerating oral antibiotics or has plans for home infusion setup: In progress   -Infection is improving: In progress    BP 97/53 (BP Location: Left arm, Patient Position: Right side, Cuff Size: Adult Regular)   Pulse 82   Temp 97.6  F (36.4  C) (Oral)   Resp 16   Ht 1.727 m (5' 8\")   SpO2 100%   BMI 22.96 kg/m      Patient denies pain. Tolerating oral intake well; appetite good. Personal hygiene and comfort items provided.       "

## 2024-07-06 NOTE — PROGRESS NOTES
Vancomycin infusion found paused and disconnected from Patient upon assumption of care at approximately 10AM. Writer reconnected and restarted infusion. Patient expressed pain at PIV site shortly after infusion continuation. Writer stopped infusion and assessed site. No skin discoloration, induration or local edema observed; tenderness upon light palpation. PIV removed per Patient request. Provider notified; aware of Pt status. Pharmacy consulted- no further interventions recommended at this time.

## 2024-07-06 NOTE — PROGRESS NOTES
[facility]    Medicine Progress Note - Hospitalist Service    Date of Admission:  7/5/2024    Assessment & Plan      Elizabeth Sam is a 29 year old female admitted on 7/5/2024. She has no significant past medical history and is admitted for treatment of uncomplicated pre-septal cellulitis.     #Preseptal cellulitis  Red, painful, and warm furcuncle on the right lateral forehead increasing in size and pain for 2 weeks. No known injury to the area. There are no red flag signs for septal/orbital cellulitis and no systemic signs of infection. CT head and facial bones confirmed NO septal/orbital cellulitis, findings consistent w/ preseptal cellulitis. Started on ceftriaxone and vancomycin. MRSA nasal swab positive for MRSA, so vancomycin continued.   Pt giving somewhat vague hx regarding tooth/gingival pain and swelling prior to preseptal cellulitis. Head imaging thus far has not shown e/o apical abscess or inflammation and no e/o infection in that area. R eye still swollen and erythematous, . BL cheeks somewhat pink and peaked. This infection can be treated on an outpatient basis with PO abx, but do want to make sure we have good infectious control before transitioning and discharging. Interview w/ pt proved to be somewhat difficult regarding details about symptoms, comparison to day previous, so will keep for an additional night in order to monitor for improvement tomorrow.   - Continue vancomycin for today and overnight w/ plan to transition to oral tomorrow.   - Continue ceftriaxone with plan to transition to oral tomorrow (likely Augmentin and Bactrim)  - Regular diet    #Gallstones  Component of her abdominal pain does certainly sound like biliary colic with right sided pain radiating to her back after eating oily foods. She has not noticed this in the past few weeks and her abdominal pain on admission is specifically not located in this area. CT on  admission did show calcified stones in her gall bladder with US showing gall stones at the neck, but without signs of cholecystitis.   - Oral diet as tolerated  - Continue to monitor    #Constipation  Component of her abdominal pain likely related to self reported constipation. Requested oral drink to help with this  - Scheduled PEG daily, recommend she continue this on discharge       Observation Goals: -diagnostic tests and consults completed and resulted, -vital signs normal or at patient baseline, -tolerating oral antibiotics or has plans for home infusion setup, -infection is improving, Nurse to notify provider when observation goals have been met and patient is ready for discharge.  Diet: Regular Diet Adult    DVT Prophylaxis: Pneumatic Compression Devices  Manzo Catheter: Not present  Lines: None     Cardiac Monitoring: None  Code Status: Full Code      Clinically Significant Risk Factors Present on Admission                                         Disposition Plan     Medically Ready for Discharge: Anticipated Tomorrow           The patient's care was discussed with the Attending Physician, Dr. Freedman .    Derrick Hoffman PA-C  Hospitalist Service  [facility]  Securely message with [employer] (more info)  Text page via [facility] Paging/Directory   ______________________________________________________________________    Interval History   Pt w/ continued swelling, redness, and tenderness to her R eye and surrounding area. Does endorse improvement in symptoms compared to yesterday. Did mention some tooth pain, but was not able to really clarify if this is more chronic in nature vs acute.     Physical Exam   Vital Signs: Temp: 97.6  F (36.4  C) Temp src: Oral BP: 97/53 Pulse: 82   Resp: 16 SpO2: 100 % O2 Device: None (Room air)    Weight: 0 lbs 0 oz    General Appearance: Laying in bed, poor eye contact.   HEENT: R eye w/ furuncle present at lateral edge of eyebrow w/  surrounding swelling and edema. R eyelid slightly more swollen than L. Cheeks do appear a bit pink and peaked. EOMI. Mouth without any obvious areas of gingival inflammation or swelling.   Respiratory: Breathing comfortably on room air.   GI: No guarding.  Skin: Face as above. Otherwise no notable abnormalities.   Other: Generally cooperative and interactive, though somewhat irritable and w/ vague answers.      Medical Decision Making       50 MINUTES SPENT BY ME on the date of service doing chart review, history, exam, documentation & further activities per the note.      Data   ------------------------- PAST 24 HR DATA REVIEWED -----------------------------------------------    I have personally reviewed the following data over the past 24 hrs:    5.7  \   12.2   / 283     141 106 8.8 /  80   3.9 24 0.99 (H) \     ALT: 22 AST: 32 AP: 93 TBILI: 0.2   ALB: 3.8 TOT PROTEIN: 7.6 LIPASE: 39     Procal: N/A CRP: 4.69 Lactic Acid: 1.1       INR:  1.03 PTT:  N/A   D-dimer:  N/A Fibrinogen:  N/A       Imaging results reviewed over the past 24 hrs:   Recent Results (from the past 24 hour(s))   CT Abdomen Pelvis w Contrast    Narrative    EXAM: CT ABDOMEN PELVIS W CONTRAST  LOCATION: [facility]  DATE: 7/5/2024    INDICATION: abd pain (somewhat diffuse, but worse in low abd), ? appy or other emergent cause  COMPARISON: None.  TECHNIQUE: CT scan of the abdomen and pelvis was performed following injection of IV contrast. Multiplanar reformats were obtained. Dose reduction techniques were used.  CONTRAST: 75cc Isovue 370    FINDINGS:   LOWER CHEST: Normal.    HEPATOBILIARY: Normal contour with no significant mass. No bile duct dilatation. Calcified gallstones.    PANCREAS: No significant mass, duct dilatation, or inflammatory change.    SPLEEN: Normal size.    ADRENAL GLANDS: No significant nodules.    KIDNEYS/BLADDER: No significant mass, stone, or hydronephrosis.    BOWEL: Normal  with no obstruction or acute inflammatory change. Nothing for appendicitis.    LYMPH NODES: No lymphadenopathy.    VASCULATURE: No abdominal aortic aneurysm.    PELVIC ORGANS: No pelvic masses.    MUSCULOSKELETAL: Unremarkable.      Impression    IMPRESSION:   1.  Cholelithiasis  2.  Normal appendix   CT Orbits w Contrast    Narrative    EXAM: CT HEAD W CONTRAST, CT ORBITS W CONTRAST  LOCATION: [facility]  DATE: 7/5/2024    INDICATION: facial infections findings, pain, periorbital swelling  COMPARISON: None.  TECHNIQUE:   1) Routine CT Head without IV contrast. Multiplanar reformats. Dose reduction techniques were used.  2) Routine CT Facial Bones without IV contrast. Multiplanar reformats. Dose reduction techniques were used.    FINDINGS:  HEAD CT:   INTRACRANIAL CONTENTS: No intracranial hemorrhage, extraaxial collection, or mass effect.  No CT evidence of acute infarct. Normal parenchymal density for age. The ventricles and sulci are normal for age.     OSSEOUS STRUCTURES/SOFT TISSUES: No significant abnormality.     FACIAL BONE CT:  OSSEOUS STRUCTURES/SOFT TISSUES: No abscess. There is mild right periorbital edema which is preseptal. Slight left supraorbital edema which is preseptal. No facial bone fracture or malalignment. No evidence for dental trauma or periapical abscess.    ORBITAL CONTENTS: Normal post septal orbits.    SINUSES: No paranasal sinus mucosal disease.      Impression    IMPRESSION:  HEAD CT:  No acute intracranial process.    ORBITS CT:  1.  Mild right periorbital and left supraorbital edema.  2.  No abscess.  3.  Normal post septal orbits.     CT Head w Contrast    Narrative    EXAM: CT HEAD W CONTRAST, CT ORBITS W CONTRAST  LOCATION: [facility]  DATE: 7/5/2024    INDICATION: facial infections findings, pain, periorbital swelling  COMPARISON: None.  TECHNIQUE:   1) Routine CT Head without IV contrast.  Multiplanar reformats. Dose reduction techniques were used.  2) Routine CT Facial Bones without IV contrast. Multiplanar reformats. Dose reduction techniques were used.    FINDINGS:  HEAD CT:   INTRACRANIAL CONTENTS: No intracranial hemorrhage, extraaxial collection, or mass effect.  No CT evidence of acute infarct. Normal parenchymal density for age. The ventricles and sulci are normal for age.     OSSEOUS STRUCTURES/SOFT TISSUES: No significant abnormality.     FACIAL BONE CT:  OSSEOUS STRUCTURES/SOFT TISSUES: No abscess. There is mild right periorbital edema which is preseptal. Slight left supraorbital edema which is preseptal. No facial bone fracture or malalignment. No evidence for dental trauma or periapical abscess.    ORBITAL CONTENTS: Normal post septal orbits.    SINUSES: No paranasal sinus mucosal disease.      Impression    IMPRESSION:  HEAD CT:  No acute intracranial process.    ORBITS CT:  1.  Mild right periorbital and left supraorbital edema.  2.  No abscess.  3.  Normal post septal orbits.     US Abdomen Limited (RUQ)    Narrative    EXAM: Right upper quadrant abdominal ultrasound 7/6/2024 2:26 AM     HISTORY: Gallstones, abdominal pain, subjective fevers, ?  cholecystitis    COMPARISON: Outside institution abdomen ultrasound 7/16/2023,  abdomen/pelvis CT 8/1/2023. Abdomen/pelvis CT with contrast 7/5/2024.    TECHNIQUE: The abdomen was scanned in standard fashion with  specialized ultrasound transducer(s) using both grey scale and limited  color Doppler techniques.    FINDINGS:     LIVER: The liver demonstrates normal homogeneous echotexture and  measures 12.2 cm in craniocaudal dimension. There is no evidence of a  focal hepatic mass. The main portal vein is patent with antegrade  flow.    GALLBLADDER: Echogenic shadowing gallstones are present, also noted on  prior CT imaging, and there are stones in the gallbladder neck. There  is no wall thickening, pericholecystic fluid, sonographic  Bray's  sign, or cholelithiasis.     BILE DUCTS: Both the intra- and extrahepatic biliary system are of  normal caliber. The common bile duct measures 6 mm in diameter.    PANCREAS: Visualized portions appear unremarkable.    RIGHT KIDNEY: The right kidney measures 9.7 cm in length. Normal  appearing renal parenchymal echogenicity and corticomedullary  differentiation. No hydronephrosis, mass, or calculus.     AORTA/IVC: The visualized portions of the aorta and IVC appear  unremarkable.     FLUID: No ascites or pleural effusions.       Impression    IMPRESSION: Cholelithiasis without ultrasonographic evidence of acute  cholecystitis. Of note, gallstones are present at the gallbladder  neck. No choledocholithiasis identified. Normal sonographic appearance  of the liver.    I have personally reviewed the examination and initial interpretation  and I agree with the findings.    LONNIE ESPINOZA MD         SYSTEM ID:  [id number]

## 2024-07-07 VITALS
BODY MASS INDEX: 22.96 KG/M2 | HEIGHT: 68 IN | TEMPERATURE: 97.5 F | DIASTOLIC BLOOD PRESSURE: 73 MMHG | OXYGEN SATURATION: 100 % | SYSTOLIC BLOOD PRESSURE: 96 MMHG | RESPIRATION RATE: 16 BRPM | HEART RATE: 72 BPM

## 2024-07-07 PROCEDURE — 250N000013 HC RX MED GY IP 250 OP 250 PS 637

## 2024-07-07 PROCEDURE — 96376 TX/PRO/DX INJ SAME DRUG ADON: CPT

## 2024-07-07 PROCEDURE — 99238 HOSP IP/OBS DSCHRG MGMT 30/<: CPT | Mod: FS

## 2024-07-07 PROCEDURE — 96367 TX/PROPH/DG ADDL SEQ IV INF: CPT

## 2024-07-07 PROCEDURE — 96366 THER/PROPH/DIAG IV INF ADDON: CPT

## 2024-07-07 PROCEDURE — 99207 PR APP CREDIT; MD BILLING SHARED VISIT: CPT | Mod: FS | Performed by: STUDENT IN AN ORGANIZED HEALTH CARE EDUCATION/TRAINING PROGRAM

## 2024-07-07 PROCEDURE — 250N000011 HC RX IP 250 OP 636: Performed by: EMERGENCY MEDICINE

## 2024-07-07 PROCEDURE — 250N000011 HC RX IP 250 OP 636

## 2024-07-07 PROCEDURE — G0378 HOSPITAL OBSERVATION PER HR: HCPCS

## 2024-07-07 RX ORDER — POLYETHYLENE GLYCOL 3350 17 G/17G
17 POWDER, FOR SOLUTION ORAL DAILY PRN
COMMUNITY
Start: 2024-07-07

## 2024-07-07 RX ORDER — SULFAMETHOXAZOLE/TRIMETHOPRIM 800-160 MG
1 TABLET ORAL 2 TIMES DAILY
Qty: 10 TABLET | Refills: 0 | Status: SHIPPED | OUTPATIENT
Start: 2024-07-07

## 2024-07-07 RX ADMIN — POLYETHYLENE GLYCOL 3350 17 G: 17 POWDER, FOR SOLUTION ORAL at 08:32

## 2024-07-07 RX ADMIN — VANCOMYCIN HYDROCHLORIDE 1000 MG: 1 INJECTION, SOLUTION INTRAVENOUS at 04:39

## 2024-07-07 RX ADMIN — Medication 5 MG: at 00:33

## 2024-07-07 RX ADMIN — CEFTRIAXONE SODIUM 1 G: 1 INJECTION, POWDER, FOR SOLUTION INTRAMUSCULAR; INTRAVENOUS at 00:01

## 2024-07-07 ASSESSMENT — ACTIVITIES OF DAILY LIVING (ADL)
ADLS_ACUITY_SCORE: 33

## 2024-07-07 NOTE — PLAN OF CARE
Goal Outcome Evaluation:           Observation Goal Outcome Evaluation     -Diagnostic tests and consults completed and resulted: Met  -Vital signs normal or at patient baseline: Met   -Tolerating oral antibiotics or has plans for home infusion setup: In progress   -Infection is improving: In progress

## 2024-07-07 NOTE — DISCHARGE SUMMARY
[facility]  Hospitalist Discharge Summary      Date of Admission:  7/5/2024  Date of Discharge:  7/7/2024  Discharging Provider: Derrick Hoffman PA-C  Discharge Service: Hospitalist Service    Discharge Diagnoses   #Preseptal cellulitis, improving  #Cholelithiasis without evidence of cholecystitis  #Constipation    Clinically Significant Risk Factors          Follow-ups Needed After Discharge   Follow-up Appointments     Adult [facility] Follow-up and recommended labs and tests      Follow up with primary care provider within 7 days for hospital follow-   up.  Follow up on your facial cellulitis to make sure it has resolved.   Also discuss your abdominal pain and any recommendations to help with   biliary colic.     Please establish with a dentist. You were offered a referral but said you   had a dentist already in mind that you would call and make an appointment   with.     Appointments on [place] and/or [facility] (with [facility] or [facility]   provider or service). Call [phone] if you haven't heard regarding   these appointments within 7 days of discharge.            Unresulted Labs Ordered in the Past 30 Days of this Admission       Date and Time Order Name Status Description    7/6/2024 10:44 AM MRSA Culture Reflex Preliminary           Discharge Disposition   Discharged to home  Condition at discharge: Stable    Hospital Course   Elizabeth Sam is a 29 year old female admitted on 7/5/2024. She has no significant past medical history and is admitted for treatment of uncomplicated pre-septal cellulitis.     #Preseptal cellulitis  Red, painful, and warm furcuncle on the right lateral forehead increasing in size and pain for 2 weeks. No known injury to the area. There are no red flag signs for septal/orbital cellulitis and no systemic signs of infection. CT head and facial bones confirmed NO septal/orbital cellulitis, findings consistent w/ preseptal cellulitis.  Started on ceftriaxone and vancomycin. MRSA nasal swab positive for MRSA, so vancomycin continued.   As of AM of 7/6, pt giving somewhat vague hx regarding tooth/gingival pain and swelling prior to preseptal cellulitis. Head imaging thus far has not shown e/o apical abscess or inflammation and no e/o infection in that area. R eye still swollen and erythematous, . BL cheeks somewhat pink and peaked. This infection can be treated on an outpatient basis with PO abx, but do want to make sure we have good infectious control before transitioning and discharging. Interview w/ pt proved to be somewhat difficult regarding details about symptoms, comparison to day previous, etc, so kept for an additional night to ensure improvement is observed.  As of AM of 7/7, swelling and erythema of face is notable improved compared to yesterday. Pt endorsing feeling better. Will send home w/ Augmentin and Bactrim to cover for MRSA since her nare swab was positive.   - S/p ceftriaxone 7/6 and vancomycin 7/6 and 7/7.  - Send home on Augmentin and Bactrim, to total 7 days abx  - Discussed return precautions  - Follow up w/ PCP within 1 week for hospital follow up.     #Cholelithiasis  #Hx concerning for biliary colic  Component of her abdominal pain does certainly sound like biliary colic with right sided pain radiating to her back after eating oily foods. She has not noticed this in the past few weeks and her abdominal pain on admission is specifically not located in this area. CT on admission did show calcified stones in her gallbladder with US showing gall stones at the neck, but without signs of cholecystitis. On day of discharge, no tenderness to palpation of RUQ or any part of the abdomen.   - Oral diet as tolerated    #Constipation  Component of her abdominal pain likely related to self reported constipation. Requested oral drink to help with this  - Scheduled miralax daily, sent home w/ rx for daily PRN  miralax    Consultations This Hospital Stay   PHARMACY TO DOSE VANCO  NURSING TO CONSULT FOR VASCULAR ACCESS CARE IP CONSULT  NURSING TO CONSULT FOR VASCULAR ACCESS CARE IP CONSULT    Code Status   Full Code    Time Spent on this Encounter   I, Derrick Hoffman PA-C, personally saw the patient today and spent less than or equal to 30 minutes discharging this patient.       Derrick Hoffman PA-C  [facility] [address] OBSERVATION  [address] [address]  Phone: [phone]  Fax: [phone]  ______________________________________________________________________    Physical Exam   Vital Signs: Temp: 97.5  F (36.4  C) Temp src: Oral BP: 96/73 Pulse: 72   Resp: 16 SpO2: 100 % O2 Device: None (Room air)    Weight: 0 lbs 0 oz  General Appearance: Well rested, comfortable, NAD.   Respiratory: Breathing comfortably on room air, no respiratory distress.   GI: Abdomen non tender, soft to the touch. No guarding, no rebound tenderness.   Skin: R eye/ temple area w/ redness and swelling, but it is noticeably improved compared to day previous.   Other: Interactive and pleasant. Alert and oriented.         Primary Care Physician   Physician No Ref-Primary    Discharge Orders      Reason for your hospital stay    You were admitted for preseptal cellulitis and treated with several days of IV antibiotics. The infection showed evidence of improvement, so we will send you home with oral antibiotics to treat the infection. Please finish the entire course of antibiotics.     Follow up with your primary care provider within 1 week to follow up on the infection and make sure it has cleared.     Activity    Your activity upon discharge: activity as tolerated     Adult [facility] Follow-up and recommended labs and tests    Follow up with primary care provider within 7 days for hospital follow- up.  Follow up on your facial cellulitis to make sure it has resolved. Also discuss your abdominal pain and any  recommendations to help with biliary colic.     Please establish with a dentist. You were offered a referral but said you had a dentist already in mind that you would call and make an appointment with.     Appointments on [place] and/or [facility] (with [facility] or [facility] provider or service). Call [phone] if you haven't heard regarding these appointments within 7 days of discharge.     Diet    Follow this diet upon discharge: Orders Placed This Encounter      Regular Diet Adult       Significant Results and Procedures   Most Recent 3 CBC's:  Recent Labs   Lab Test 07/06/24 0624 07/05/24 2058 03/01/24  1041   WBC 5.7 5.7 7.0   HGB 12.2 13.1 12.5   MCV 91 91 90    305 338     Most Recent 3 BMP's:  Recent Labs   Lab Test 07/05/24 2058 03/01/24  1041    138   POTASSIUM 3.9 3.7   CHLORIDE 106 104   CO2 24 23   BUN 8.8 6.9   CR 0.99* 0.46*   ANIONGAP 11 11   WOLFGANG 9.3 8.9   GLC 80 86     7-Day Micro Results       Collected Updated Procedure Result Status      07/06/2024 0904 07/06/2024 1044 MRSA MSSA PCR, Nasal Swab [[id number]]    (Abnormal)   Swab from Nose    Final result Component Value   MRSA Target DNA Positive   SA Target DNA Positive            07/06/2024 0904 07/07/2024 1108 MRSA Culture Reflex [[id number]]   (Abnormal)   Swab from Nose    Preliminary result Component Value   Culture Culture in progress  [P]     Staphylococcus aureus MRSA  [P]     Preliminary result, confirmation pending.                 07/06/2024 0559 07/06/2024 0747 Asymptomatic COVID-19 Virus (Coronavirus) by PCR Nasopharyngeal [[id number]]    Swab from Nasopharyngeal    Final result Component Value   SARS CoV2 PCR Negative   NEGATIVE: SARS-CoV-2 (COVID-19) RNA not detected, presumed negative.                ,   Results for orders placed or performed during the hospital encounter of 07/05/24   CT Head w Contrast    Narrative    EXAM: CT HEAD W CONTRAST, CT ORBITS W CONTRAST  LOCATION: [facility]  [facility]  DATE: 7/5/2024    INDICATION: facial infections findings, pain, periorbital swelling  COMPARISON: None.  TECHNIQUE:   1) Routine CT Head without IV contrast. Multiplanar reformats. Dose reduction techniques were used.  2) Routine CT Facial Bones without IV contrast. Multiplanar reformats. Dose reduction techniques were used.    FINDINGS:  HEAD CT:   INTRACRANIAL CONTENTS: No intracranial hemorrhage, extraaxial collection, or mass effect.  No CT evidence of acute infarct. Normal parenchymal density for age. The ventricles and sulci are normal for age.     OSSEOUS STRUCTURES/SOFT TISSUES: No significant abnormality.     FACIAL BONE CT:  OSSEOUS STRUCTURES/SOFT TISSUES: No abscess. There is mild right periorbital edema which is preseptal. Slight left supraorbital edema which is preseptal. No facial bone fracture or malalignment. No evidence for dental trauma or periapical abscess.    ORBITAL CONTENTS: Normal post septal orbits.    SINUSES: No paranasal sinus mucosal disease.      Impression    IMPRESSION:  HEAD CT:  No acute intracranial process.    ORBITS CT:  1.  Mild right periorbital and left supraorbital edema.  2.  No abscess.  3.  Normal post septal orbits.     CT Orbits w Contrast    Narrative    EXAM: CT HEAD W CONTRAST, CT ORBITS W CONTRAST  LOCATION: [facility]  DATE: 7/5/2024    INDICATION: facial infections findings, pain, periorbital swelling  COMPARISON: None.  TECHNIQUE:   1) Routine CT Head without IV contrast. Multiplanar reformats. Dose reduction techniques were used.  2) Routine CT Facial Bones without IV contrast. Multiplanar reformats. Dose reduction techniques were used.    FINDINGS:  HEAD CT:   INTRACRANIAL CONTENTS: No intracranial hemorrhage, extraaxial collection, or mass effect.  No CT evidence of acute infarct. Normal parenchymal density for age. The ventricles and sulci are normal for age.     OSSEOUS  STRUCTURES/SOFT TISSUES: No significant abnormality.     FACIAL BONE CT:  OSSEOUS STRUCTURES/SOFT TISSUES: No abscess. There is mild right periorbital edema which is preseptal. Slight left supraorbital edema which is preseptal. No facial bone fracture or malalignment. No evidence for dental trauma or periapical abscess.    ORBITAL CONTENTS: Normal post septal orbits.    SINUSES: No paranasal sinus mucosal disease.      Impression    IMPRESSION:  HEAD CT:  No acute intracranial process.    ORBITS CT:  1.  Mild right periorbital and left supraorbital edema.  2.  No abscess.  3.  Normal post septal orbits.     CT Abdomen Pelvis w Contrast    Narrative    EXAM: CT ABDOMEN PELVIS W CONTRAST  LOCATION: [facility]  DATE: 7/5/2024    INDICATION: abd pain (somewhat diffuse, but worse in low abd), ? appy or other emergent cause  COMPARISON: None.  TECHNIQUE: CT scan of the abdomen and pelvis was performed following injection of IV contrast. Multiplanar reformats were obtained. Dose reduction techniques were used.  CONTRAST: 75cc Isovue 370    FINDINGS:   LOWER CHEST: Normal.    HEPATOBILIARY: Normal contour with no significant mass. No bile duct dilatation. Calcified gallstones.    PANCREAS: No significant mass, duct dilatation, or inflammatory change.    SPLEEN: Normal size.    ADRENAL GLANDS: No significant nodules.    KIDNEYS/BLADDER: No significant mass, stone, or hydronephrosis.    BOWEL: Normal with no obstruction or acute inflammatory change. Nothing for appendicitis.    LYMPH NODES: No lymphadenopathy.    VASCULATURE: No abdominal aortic aneurysm.    PELVIC ORGANS: No pelvic masses.    MUSCULOSKELETAL: Unremarkable.      Impression    IMPRESSION:   1.  Cholelithiasis  2.  Normal appendix   US Abdomen Limited (RUQ)    Narrative    EXAM: Right upper quadrant abdominal ultrasound 7/6/2024 2:26 AM     HISTORY: Gallstones, abdominal pain, subjective fevers,  ?  cholecystitis    COMPARISON: Outside institution abdomen ultrasound 7/16/2023,  abdomen/pelvis CT 8/1/2023. Abdomen/pelvis CT with contrast 7/5/2024.    TECHNIQUE: The abdomen was scanned in standard fashion with  specialized ultrasound transducer(s) using both grey scale and limited  color Doppler techniques.    FINDINGS:     LIVER: The liver demonstrates normal homogeneous echotexture and  measures 12.2 cm in craniocaudal dimension. There is no evidence of a  focal hepatic mass. The main portal vein is patent with antegrade  flow.    GALLBLADDER: Echogenic shadowing gallstones are present, also noted on  prior CT imaging, and there are stones in the gallbladder neck. There  is no wall thickening, pericholecystic fluid, sonographic Bray's  sign, or cholelithiasis.     BILE DUCTS: Both the intra- and extrahepatic biliary system are of  normal caliber. The common bile duct measures 6 mm in diameter.    PANCREAS: Visualized portions appear unremarkable.    RIGHT KIDNEY: The right kidney measures 9.7 cm in length. Normal  appearing renal parenchymal echogenicity and corticomedullary  differentiation. No hydronephrosis, mass, or calculus.     AORTA/IVC: The visualized portions of the aorta and IVC appear  unremarkable.     FLUID: No ascites or pleural effusions.       Impression    IMPRESSION: Cholelithiasis without ultrasonographic evidence of acute  cholecystitis. Of note, gallstones are present at the gallbladder  neck. No choledocholithiasis identified. Normal sonographic appearance  of the liver.    I have personally reviewed the examination and initial interpretation  and I agree with the findings.    LONNIE ESPINOZA MD         SYSTEM ID:  [id number]       Discharge Medications   Current Discharge Medication List        START taking these medications    Details   amoxicillin-clavulanate (AUGMENTIN) 875-125 MG tablet Take 1 tablet by mouth 2 times daily  Qty: 10 tablet, Refills: 0    Associated Diagnoses:  Preseptal cellulitis      polyethylene glycol (MIRALAX) 17 GM/Dose powder Take 17 g by mouth daily as needed for constipation    Associated Diagnoses: Constipation, unspecified constipation type      sulfamethoxazole-trimethoprim (BACTRIM DS) 800-160 MG tablet Take 1 tablet by mouth 2 times daily  Qty: 10 tablet, Refills: 0    Associated Diagnoses: Preseptal cellulitis           Allergies   No Known Allergies

## 2024-07-07 NOTE — PROGRESS NOTES
"Patient's After Visit Summary (AVS) was reviewed with Patient and  utilizing  services. Patient and  verbalized understanding of AVS, including recommended follow up, and were given an opportunity to ask questions. PIV removed. Patient is steady and alert; ambulating independently; VSS. Patient and  escorted to discharge pharmacy for assistance picking up prescription, as well as an OTC probiotic supplement per Patient request. Discharged with , who will transport Patient home.     BP 96/73 (BP Location: Left arm)   Pulse 72   Temp 97.5  F (36.4  C) (Oral)   Resp 16   Ht 1.727 m (5' 8\")   SpO2 100%   BMI 22.96 kg/m          "

## 2024-07-07 NOTE — PLAN OF CARE
"  Status: Full Code   VS: /79 (BP Location: Left arm)   Pulse 85   Temp 97.8  F (36.6  C) (Oral)   Resp 16   Ht 1.727 m (5' 8\")   SpO2 100%   BMI 22.96 kg/m      Neuros: A&OX4  Cardiac: WNL  Respiratory: WNL pt RA  GI/: voiding without difficulty  Diet/Nausea: pt florecita nausea, reg diet    Skin: WNL    LDA: PIV SL in between IV Abx  Pain: no PRN's given   Activity: SBA/ind  New changes this shift:  Have to get IV Vanco from pharmacy as med fridge in the unit is not working.  Plan: continues plan of care            Goal Outcome Evaluation:                        "

## 2024-07-07 NOTE — PLAN OF CARE
"Observation Goal Outcome Evaluation    -Diagnostic tests and consults completed and resulted: Met   -Vital signs normal or at patient baseline: Met   -Tolerating oral antibiotics or has plans for home infusion setup: In progress  -Infection is improving: Met     BP 96/73 (BP Location: Left arm)   Pulse 72   Temp 97.5  F (36.4  C) (Oral)   Resp 16   Ht 1.727 m (5' 8\")   SpO2 100%   BMI 22.96 kg/m      "

## 2024-07-07 NOTE — PROGRESS NOTES
Observation goals  -Diagnostic tests and consults completed and resulted: Met  -Vital signs normal or at patient baseline: Met   -Tolerating oral antibiotics or has plans for home infusion setup: In progress   -Infection is improving: In progress

## 2024-07-07 NOTE — PLAN OF CARE
"Goal Outcome Evaluation:              Observation Goal Outcome Evaluation     -Diagnostic tests and consults completed and resulted: Met  -Vital signs normal or at patient baseline: Met   -Tolerating oral antibiotics or has plans for home infusion setup: In progress   -Infection is improving: In progress        Family provided food, patient tolerated well. Denies nausea and abdominal pain.   BP 97/53 (BP Location: Left arm, Patient Position: Right side, Cuff Size: Adult Regular)   Pulse 82   Temp 97.6  F (36.4  C) (Oral)   Resp 16   Ht 1.727 m (5' 8\")   SpO2 100%   BMI 22.96 kg/m                 "

## 2024-07-09 LAB — BACTERIA SPEC CULT: ABNORMAL

## 2024-08-23 ENCOUNTER — APPOINTMENT (OUTPATIENT)
Dept: INTERPRETER SERVICES | Facility: CLINIC | Age: 29
End: 2024-08-23
Payer: COMMERCIAL

## 2024-08-23 ENCOUNTER — TELEPHONE (OUTPATIENT)
Dept: GASTROENTEROLOGY | Facility: CLINIC | Age: 29
End: 2024-08-23
Payer: COMMERCIAL

## 2024-09-19 ENCOUNTER — APPOINTMENT (OUTPATIENT)
Dept: ULTRASOUND IMAGING | Facility: CLINIC | Age: 29
End: 2024-09-19
Attending: EMERGENCY MEDICINE
Payer: COMMERCIAL

## 2024-09-19 ENCOUNTER — HOSPITAL ENCOUNTER (EMERGENCY)
Facility: CLINIC | Age: 29
Discharge: HOME | End: 2024-09-19
Attending: EMERGENCY MEDICINE | Admitting: EMERGENCY MEDICINE
Payer: COMMERCIAL

## 2024-09-19 VITALS
OXYGEN SATURATION: 97 % | SYSTOLIC BLOOD PRESSURE: 119 MMHG | DIASTOLIC BLOOD PRESSURE: 67 MMHG | RESPIRATION RATE: 18 BRPM | TEMPERATURE: 97.5 F | HEART RATE: 74 BPM

## 2024-09-19 DIAGNOSIS — K80.20 SYMPTOMATIC CHOLELITHIASIS: ICD-10-CM

## 2024-09-19 LAB
ALBUMIN SERPL BCG-MCNC: 3.8 G/DL (ref 3.5–5.2)
ALP SERPL-CCNC: 90 U/L (ref 40–150)
ALT SERPL W P-5'-P-CCNC: 17 U/L (ref 0–50)
ANION GAP SERPL CALCULATED.3IONS-SCNC: 10 MMOL/L (ref 7–15)
AST SERPL W P-5'-P-CCNC: 24 U/L (ref 0–45)
ATRIAL RATE - MUSE: 73 BPM
BASOPHILS # BLD AUTO: 0 10E3/UL (ref 0–0.2)
BASOPHILS NFR BLD AUTO: 0 %
BILIRUB SERPL-MCNC: 0.2 MG/DL
BUN SERPL-MCNC: 7.7 MG/DL (ref 6–20)
CALCIUM SERPL-MCNC: 8.7 MG/DL (ref 8.8–10.4)
CHLORIDE SERPL-SCNC: 105 MMOL/L (ref 98–107)
CREAT SERPL-MCNC: 0.66 MG/DL (ref 0.51–0.95)
DIASTOLIC BLOOD PRESSURE - MUSE: NORMAL MMHG
EGFRCR SERPLBLD CKD-EPI 2021: >90 ML/MIN/1.73M2
EOSINOPHIL # BLD AUTO: 0.1 10E3/UL (ref 0–0.7)
EOSINOPHIL NFR BLD AUTO: 2 %
ERYTHROCYTE [DISTWIDTH] IN BLOOD BY AUTOMATED COUNT: 12.2 % (ref 10–15)
GLUCOSE SERPL-MCNC: 93 MG/DL (ref 70–99)
HCG SERPL QL: NEGATIVE
HCO3 SERPL-SCNC: 23 MMOL/L (ref 22–29)
HCT VFR BLD AUTO: 35.9 % (ref 35–47)
HGB BLD-MCNC: 11.9 G/DL (ref 11.7–15.7)
HOLD SPECIMEN: NORMAL
IMM GRANULOCYTES # BLD: 0 10E3/UL
IMM GRANULOCYTES NFR BLD: 1 %
INTERPRETATION ECG - MUSE: NORMAL
LIPASE SERPL-CCNC: 43 U/L (ref 13–60)
LYMPHOCYTES # BLD AUTO: 1.7 10E3/UL (ref 0.8–5.3)
LYMPHOCYTES NFR BLD AUTO: 38 %
MCH RBC QN AUTO: 29.6 PG (ref 26.5–33)
MCHC RBC AUTO-ENTMCNC: 33.1 G/DL (ref 31.5–36.5)
MCV RBC AUTO: 89 FL (ref 78–100)
MONOCYTES # BLD AUTO: 0.4 10E3/UL (ref 0–1.3)
MONOCYTES NFR BLD AUTO: 10 %
NEUTROPHILS # BLD AUTO: 2.2 10E3/UL (ref 1.6–8.3)
NEUTROPHILS NFR BLD AUTO: 49 %
NRBC # BLD AUTO: 0 10E3/UL
NRBC BLD AUTO-RTO: 0 /100
P AXIS - MUSE: 69 DEGREES
PLATELET # BLD AUTO: 235 10E3/UL (ref 150–450)
POTASSIUM SERPL-SCNC: 3.8 MMOL/L (ref 3.4–5.3)
PR INTERVAL - MUSE: 234 MS
PROT SERPL-MCNC: 7 G/DL (ref 6.4–8.3)
QRS DURATION - MUSE: 86 MS
QT - MUSE: 402 MS
QTC - MUSE: 442 MS
R AXIS - MUSE: 27 DEGREES
RBC # BLD AUTO: 4.02 10E6/UL (ref 3.8–5.2)
SODIUM SERPL-SCNC: 138 MMOL/L (ref 135–145)
SYSTOLIC BLOOD PRESSURE - MUSE: NORMAL MMHG
T AXIS - MUSE: 26 DEGREES
TROPONIN T SERPL HS-MCNC: 9 NG/L
VENTRICULAR RATE- MUSE: 73 BPM
WBC # BLD AUTO: 4.6 10E3/UL (ref 4–11)

## 2024-09-19 PROCEDURE — 36415 COLL VENOUS BLD VENIPUNCTURE: CPT | Performed by: EMERGENCY MEDICINE

## 2024-09-19 PROCEDURE — 84703 CHORIONIC GONADOTROPIN ASSAY: CPT | Performed by: EMERGENCY MEDICINE

## 2024-09-19 PROCEDURE — 96375 TX/PRO/DX INJ NEW DRUG ADDON: CPT | Performed by: EMERGENCY MEDICINE

## 2024-09-19 PROCEDURE — 85025 COMPLETE CBC W/AUTO DIFF WBC: CPT | Performed by: EMERGENCY MEDICINE

## 2024-09-19 PROCEDURE — 99285 EMERGENCY DEPT VISIT HI MDM: CPT | Performed by: EMERGENCY MEDICINE

## 2024-09-19 PROCEDURE — 99285 EMERGENCY DEPT VISIT HI MDM: CPT | Mod: 25 | Performed by: EMERGENCY MEDICINE

## 2024-09-19 PROCEDURE — 93005 ELECTROCARDIOGRAM TRACING: CPT | Performed by: EMERGENCY MEDICINE

## 2024-09-19 PROCEDURE — 250N000011 HC RX IP 250 OP 636: Performed by: EMERGENCY MEDICINE

## 2024-09-19 PROCEDURE — 84484 ASSAY OF TROPONIN QUANT: CPT | Performed by: EMERGENCY MEDICINE

## 2024-09-19 PROCEDURE — 96374 THER/PROPH/DIAG INJ IV PUSH: CPT | Performed by: EMERGENCY MEDICINE

## 2024-09-19 PROCEDURE — 83690 ASSAY OF LIPASE: CPT | Performed by: EMERGENCY MEDICINE

## 2024-09-19 PROCEDURE — 82040 ASSAY OF SERUM ALBUMIN: CPT | Performed by: EMERGENCY MEDICINE

## 2024-09-19 PROCEDURE — 76705 ECHO EXAM OF ABDOMEN: CPT

## 2024-09-19 PROCEDURE — 93010 ELECTROCARDIOGRAM REPORT: CPT | Performed by: EMERGENCY MEDICINE

## 2024-09-19 RX ORDER — OXYCODONE HYDROCHLORIDE 5 MG/1
5 TABLET ORAL EVERY 6 HOURS PRN
Qty: 3 TABLET | Refills: 0 | Status: SHIPPED | OUTPATIENT
Start: 2024-09-19 | End: 2024-09-22

## 2024-09-19 RX ORDER — ONDANSETRON 2 MG/ML
8 INJECTION INTRAMUSCULAR; INTRAVENOUS ONCE
Status: COMPLETED | OUTPATIENT
Start: 2024-09-19 | End: 2024-09-19

## 2024-09-19 RX ORDER — HYDROMORPHONE HYDROCHLORIDE 1 MG/ML
0.5 INJECTION, SOLUTION INTRAMUSCULAR; INTRAVENOUS; SUBCUTANEOUS ONCE
Status: COMPLETED | OUTPATIENT
Start: 2024-09-19 | End: 2024-09-19

## 2024-09-19 RX ADMIN — ONDANSETRON 8 MG: 2 INJECTION INTRAMUSCULAR; INTRAVENOUS at 03:38

## 2024-09-19 RX ADMIN — HYDROMORPHONE HYDROCHLORIDE 0.5 MG: 1 INJECTION, SOLUTION INTRAMUSCULAR; INTRAVENOUS; SUBCUTANEOUS at 03:41

## 2024-09-19 ASSESSMENT — ACTIVITIES OF DAILY LIVING (ADL)
ADLS_ACUITY_SCORE: 37

## 2024-09-19 NOTE — ED TRIAGE NOTES
Pt reporting pain to her chest and abdomen that radiates to her back. Has had pain for the past two weeks, but has gotten worse over the past three days. Pt currently nauseous.

## 2024-09-19 NOTE — DISCHARGE INSTRUCTIONS
Please make an appointment to follow up with [facility](phone: [phone]) within the next couple of weeks. Eat a low fat diet. Return with any worsening or concerns.

## 2024-09-19 NOTE — ED TRIAGE NOTES
Triage Assessment (Adult)       Row Name 09/19/24 0244          Triage Assessment    Airway WDL WDL        Respiratory WDL    Respiratory WDL WDL        Skin Circulation/Temperature WDL    Skin Circulation/Temperature WDL WDL        Cardiac WDL    Cardiac WDL X;chest pain        Chest Pain Assessment    Chest Pain Location epigastric     Chest Pain Radiation back        Peripheral/Neurovascular WDL    Peripheral Neurovascular WDL WDL        Cognitive/Neuro/Behavioral WDL    Cognitive/Neuro/Behavioral WDL WDL

## 2024-09-19 NOTE — ED PROVIDER NOTES
ED Provider Note  [facility]      History     Chief Complaint   Patient presents with    Chest Pain    Abdominal Pain    Back Pain     HPI  Elizabeth Sam is a 29 year old female with a history of cholelithiasis presents to the ED with primary complaint of right upper quadrant abdominal pain radiating to the back.  She states that she had similar symptoms 2 weeks ago that resolved, returned again tonight at roughly 6 PM, approximately 9 hours prior to presentation.  She states pain has been constant since it started, severe.  She states that she ate after the pain started, not before.  No fever, cough, shortness of breath.  She did vomit 1 time.  No diarrhea.  No dysuria.  She states she has not had cholecystectomy.  She states at times the pain radiates up into her chest.    Of note, the patient's first language is not English, family member interprets per her request.    Past Medical History  No past medical history on file.  No past surgical history on file.  amoxicillin-clavulanate (AUGMENTIN) 875-125 MG tablet  polyethylene glycol (MIRALAX) 17 GM/Dose powder  sulfamethoxazole-trimethoprim (BACTRIM DS) 800-160 MG tablet  oxyCODONE (ROXICODONE) 5 MG tablet      No Known Allergies  Family History  Family History   Problem Relation Age of Onset    Liver Disease Mother         Hepatitis B    Hypertension Father      Social History   Social History     Tobacco Use    Smoking status: Some Days     Types: Hookah   Substance Use Topics    Alcohol use: Not Currently    Drug use: Not Currently      A medically appropriate review of systems was performed with pertinent positives and negatives noted in the HPI, and all other systems negative.    Physical Exam   BP: 124/86  Pulse: 74  Temp: 97.5  F (36.4  C)  Resp: 18  SpO2: 100 %  Physical Exam  Constitutional:       General: She is in acute distress (appears uncomfortable).      Appearance: Normal appearance. She is not toxic-appearing.   HENT:       Head: Atraumatic.      Mouth/Throat:      Mouth: Mucous membranes are moist.   Eyes:      General: No scleral icterus.     Conjunctiva/sclera: Conjunctivae normal.   Cardiovascular:      Rate and Rhythm: Normal rate.      Heart sounds: Normal heart sounds.   Pulmonary:      Effort: No respiratory distress.      Breath sounds: Normal breath sounds.   Abdominal:      Palpations: Abdomen is soft.      Tenderness: There is abdominal tenderness (significant tenderness with guarding in the RUQ).   Musculoskeletal:         General: No deformity.      Cervical back: Neck supple.   Skin:     General: Skin is warm.      Findings: No rash.   Neurological:      Mental Status: She is alert.           ED Course, Procedures, & Data      Procedures            EKG Interpretation:      Interpreted by Phoebe Jones MD  Time reviewed: 0310  Symptoms at time of EKG: RUQ pain   Rhythm: 1 degree AV block  Rate: 74  Axis: Normal  Ectopy: none  Conduction: normal  ST Segments/ T Waves: No ST-T wave changes  Q Waves: v1 and v2  Comparison to prior: No old EKG available    Clinical Impression: abnormal EKG                 Results for orders placed or performed during the hospital encounter of 09/19/24   US Abdomen Limited     Status: None    Narrative    EXAM: US ABDOMEN LIMITED  LOCATION: [facility]  DATE: 9/19/2024    INDICATION: RUQ pain, known gallstones, eval cholecystitis  COMPARISON: CT from 7/5/2024.  TECHNIQUE: Limited abdominal ultrasound.    FINDINGS:    GALLBLADDER: There is a large shadowing gallstone at the gallbladder neck. There is no wall thickening. No pericholecystic fluid. Sonographic Bray sign was reported to be negative.    BILE DUCTS: No intrahepatic biliary dilatation. The common duct measures 9 mm.    LIVER: Normal parenchyma with smooth contour. No focal mass.    RIGHT KIDNEY: No hydronephrosis.    PANCREAS: The visualized portions are normal.    No  ascites.      Impression    IMPRESSION:  1.  Large gallstone present at the gallbladder neck. No secondary sonographic signs of cholecystitis at this time.    2.  The common bile duct is mildly dilated, though no obstructing stone is visualized. If liver function tests are abnormal, MRCP could be considered.       Troponin T, High Sensitivity     Status: Normal   Result Value Ref Range    Troponin T, High Sensitivity 9 <=14 ng/L   Comprehensive metabolic panel     Status: Abnormal   Result Value Ref Range    Sodium 138 135 - 145 mmol/L    Potassium 3.8 3.4 - 5.3 mmol/L    Carbon Dioxide (CO2) 23 22 - 29 mmol/L    Anion Gap 10 7 - 15 mmol/L    Urea Nitrogen 7.7 6.0 - 20.0 mg/dL    Creatinine 0.66 0.51 - 0.95 mg/dL    GFR Estimate >90 >60 mL/min/1.73m2    Calcium 8.7 (L) 8.8 - 10.4 mg/dL    Chloride 105 98 - 107 mmol/L    Glucose 93 70 - 99 mg/dL    Alkaline Phosphatase 90 40 - 150 U/L    AST 24 0 - 45 U/L    ALT 17 0 - 50 U/L    Protein Total 7.0 6.4 - 8.3 g/dL    Albumin 3.8 3.5 - 5.2 g/dL    Bilirubin Total 0.2 <=1.2 mg/dL   Lipase     Status: Normal   Result Value Ref Range    Lipase 43 13 - 60 U/L   HCG qualitative pregnancy (blood)     Status: Normal   Result Value Ref Range    hCG Serum Qualitative Negative Negative   CBC with platelets and differential     Status: None   Result Value Ref Range    WBC Count 4.6 4.0 - 11.0 10e3/uL    RBC Count 4.02 3.80 - 5.20 10e6/uL    Hemoglobin 11.9 11.7 - 15.7 g/dL    Hematocrit 35.9 35.0 - 47.0 %    MCV 89 78 - 100 fL    MCH 29.6 26.5 - 33.0 pg    MCHC 33.1 31.5 - 36.5 g/dL    RDW 12.2 10.0 - 15.0 %    Platelet Count 235 150 - 450 10e3/uL    % Neutrophils 49 %    % Lymphocytes 38 %    % Monocytes 10 %    % Eosinophils 2 %    % Basophils 0 %    % Immature Granulocytes 1 %    NRBCs per 100 WBC 0 <1 /100    Absolute Neutrophils 2.2 1.6 - 8.3 10e3/uL    Absolute Lymphocytes 1.7 0.8 - 5.3 10e3/uL    Absolute Monocytes 0.4 0.0 - 1.3 10e3/uL    Absolute Eosinophils 0.1 0.0 - 0.7  10e3/uL    Absolute Basophils 0.0 0.0 - 0.2 10e3/uL    Absolute Immature Granulocytes 0.0 <=0.4 10e3/uL    Absolute NRBCs 0.0 10e3/uL   Extra Tube     Status: None    Narrative    The following orders were created for panel order Extra Tube.  Procedure                               Abnormality         Status                     ---------                               -----------         ------                     Extra Blue Top Tube[[id number]]                              Final result                 Please view results for these tests on the individual orders.   Extra Blue Top Tube     Status: None   Result Value Ref Range    Hold Specimen [facility]    EKG 12 lead     Status: None (Preliminary result)   Result Value Ref Range    Systolic Blood Pressure  mmHg    Diastolic Blood Pressure  mmHg    Ventricular Rate 73 BPM    Atrial Rate 73 BPM    HI Interval 234 ms    QRS Duration 86 ms     ms    QTc 442 ms    P Axis 69 degrees    R AXIS 27 degrees    T Axis 26 degrees    Interpretation ECG       Sinus rhythm with 1st degree A-V block  Septal infarct , age undetermined  Abnormal ECG     CBC with platelets differential     Status: None    Narrative    The following orders were created for panel order CBC with platelets differential.  Procedure                               Abnormality         Status                     ---------                               -----------         ------                     CBC with platelets and d...[[id number]]                      Final result                 Please view results for these tests on the individual orders.     Medications   ondansetron (ZOFRAN) injection 8 mg (8 mg Intravenous $Given 9/19/24 6155)   HYDROmorphone (PF) (DILAUDID) injection 0.5 mg (0.5 mg Intravenous $Given 9/19/24 4492)     Labs Ordered and Resulted from Time of ED Arrival to Time of ED Departure   COMPREHENSIVE METABOLIC PANEL - Abnormal       Result Value    Sodium 138      Potassium 3.8      Carbon  Dioxide (CO2) 23      Anion Gap 10      Urea Nitrogen 7.7      Creatinine 0.66      GFR Estimate >90      Calcium 8.7 (*)     Chloride 105      Glucose 93      Alkaline Phosphatase 90      AST 24      ALT 17      Protein Total 7.0      Albumin 3.8      Bilirubin Total 0.2     TROPONIN T, HIGH SENSITIVITY - Normal    Troponin T, High Sensitivity 9     LIPASE - Normal    Lipase 43     HCG QUALITATIVE PREGNANCY - Normal    hCG Serum Qualitative Negative     CBC WITH PLATELETS AND DIFFERENTIAL    WBC Count 4.6      RBC Count 4.02      Hemoglobin 11.9      Hematocrit 35.9      MCV 89      MCH 29.6      MCHC 33.1      RDW 12.2      Platelet Count 235      % Neutrophils 49      % Lymphocytes 38      % Monocytes 10      % Eosinophils 2      % Basophils 0      % Immature Granulocytes 1      NRBCs per 100 WBC 0      Absolute Neutrophils 2.2      Absolute Lymphocytes 1.7      Absolute Monocytes 0.4      Absolute Eosinophils 0.1      Absolute Basophils 0.0      Absolute Immature Granulocytes 0.0      Absolute NRBCs 0.0       US Abdomen Limited   Final Result   IMPRESSION:   1.  Large gallstone present at the gallbladder neck. No secondary sonographic signs of cholecystitis at this time.      2.  The common bile duct is mildly dilated, though no obstructing stone is visualized. If liver function tests are abnormal, MRCP could be considered.                   Critical care was not performed.     Medical Decision Making  The patient's presentation was of moderate complexity (a chronic illness mild to moderate exacerbation, progression, or side effect of treatment).    The patient's evaluation involved:  review of external note(s) from 1 sources (previous note)  review of 3+ test result(s) ordered prior to this encounter (previous results)  ordering and/or review of 3+ test(s) in this encounter (see separate area of note for details)    The patient's management necessitated high risk (a parenteral controlled  substance).    Assessment & Plan    The patient looked very uncomfortable upon arrival.  The primary location of pain was in the right upper quadrant with some radiation to the right flank, chest.  EKG did show some Q waves in V1 and V2 with no old for comparison.  Troponin was 9, in light of the fact that she had had chest pain for 9 hours, I do think this effectively excludes acute coronary syndrome.  I do not think second/delta needs to be performed.  Exam revealed significant tenderness to the right upper quadrant.  She has known cholelithiasis and it was concern for cholecystitis given the presentation.  She was given Zofran as well as a dose of Dilaudid.  LFTs are unremarkable, lipase normal.  White blood cell count was normal.  Ultrasound showed a large gallstone present at the gallbladder neck without secondary sonographic signs of cholecystitis.  Common bile duct is mildly dilated with no obstructing stone visualized.  On repeat evaluation, she states that she is completely pain-free/symptom-free.  It has been roughly 2 hours since she last received Dilaudid.  Given this, it would expected to be wearing off.  I think it is very reassuring that she is completely symptom-free at this time.  She tolerated p.o. challenge.  I did discuss at length with her cholelithiasis, risk of progression of cholecystitis at some point if the gallbladder is not removed.  I also discussed that she will likely continue to have gallbladder flares.  She does state that she ate fried chicken last night prior to pain starting.  I did discuss low-fat diet.  I will give a prescription for 3 tablets of oxycodone.  She is strongly encouraged to follow-up with general surgery, return with any new or worsening symptoms, any other concerns.  She verbalizes understanding and is agreeable to the plan.    Dictation Disclaimer: Some of this Note has been completed with voice-recognition dictation software. Although errors are generally  corrected real-time, there is the potential for a rare error to be present in the completed chart.      I have reviewed the nursing notes. I have reviewed the findings, diagnosis, plan and need for follow up with the patient.    New Prescriptions    OXYCODONE (ROXICODONE) 5 MG TABLET    Take 1 tablet (5 mg) by mouth every 6 hours as needed for pain.       Final diagnoses:   Symptomatic cholelithiasis       Phoebe Jones  [facility] EMERGENCY DEPARTMENT  9/19/2024     Phoebe Jones MD  09/19/24 0582

## 2024-12-13 ENCOUNTER — HOSPITAL ENCOUNTER (EMERGENCY)
Facility: CLINIC | Age: 29
Discharge: HOME | End: 2024-12-13
Admitting: PHYSICIAN ASSISTANT
Payer: COMMERCIAL

## 2024-12-13 ENCOUNTER — APPOINTMENT (OUTPATIENT)
Dept: GENERAL RADIOLOGY | Facility: CLINIC | Age: 29
End: 2024-12-13
Attending: PHYSICIAN ASSISTANT
Payer: COMMERCIAL

## 2024-12-13 VITALS
DIASTOLIC BLOOD PRESSURE: 82 MMHG | BODY MASS INDEX: 22.22 KG/M2 | OXYGEN SATURATION: 97 % | TEMPERATURE: 98 F | HEART RATE: 101 BPM | HEIGHT: 69 IN | SYSTOLIC BLOOD PRESSURE: 121 MMHG | RESPIRATION RATE: 16 BRPM | WEIGHT: 150 LBS

## 2024-12-13 DIAGNOSIS — U07.1 COVID-19: ICD-10-CM

## 2024-12-13 DIAGNOSIS — H66.92 LEFT OTITIS MEDIA: ICD-10-CM

## 2024-12-13 LAB
ALBUMIN SERPL BCG-MCNC: 3.5 G/DL (ref 3.5–5.2)
ALP SERPL-CCNC: 91 U/L (ref 40–150)
ALT SERPL W P-5'-P-CCNC: 8 U/L (ref 0–50)
ANION GAP SERPL CALCULATED.3IONS-SCNC: 11 MMOL/L (ref 7–15)
AST SERPL W P-5'-P-CCNC: 19 U/L (ref 0–45)
BASOPHILS # BLD AUTO: 0.1 10E3/UL (ref 0–0.2)
BASOPHILS NFR BLD AUTO: 1 %
BILIRUB SERPL-MCNC: 0.2 MG/DL
BUN SERPL-MCNC: 9.4 MG/DL (ref 6–20)
CALCIUM SERPL-MCNC: 9 MG/DL (ref 8.8–10.4)
CHLORIDE SERPL-SCNC: 106 MMOL/L (ref 98–107)
CREAT SERPL-MCNC: 0.55 MG/DL (ref 0.51–0.95)
EGFRCR SERPLBLD CKD-EPI 2021: >90 ML/MIN/1.73M2
EOSINOPHIL # BLD AUTO: 0.2 10E3/UL (ref 0–0.7)
EOSINOPHIL NFR BLD AUTO: 3 %
ERYTHROCYTE [DISTWIDTH] IN BLOOD BY AUTOMATED COUNT: 12.6 % (ref 10–15)
FLUAV RNA SPEC QL NAA+PROBE: NEGATIVE
FLUBV RNA RESP QL NAA+PROBE: NEGATIVE
GLUCOSE SERPL-MCNC: 87 MG/DL (ref 70–99)
HCG SERPL QL: NEGATIVE
HCO3 SERPL-SCNC: 21 MMOL/L (ref 22–29)
HCT VFR BLD AUTO: 36.3 % (ref 35–47)
HGB BLD-MCNC: 12.2 G/DL (ref 11.7–15.7)
HOLD SPECIMEN: NORMAL
IMM GRANULOCYTES # BLD: 0 10E3/UL
IMM GRANULOCYTES NFR BLD: 0 %
LYMPHOCYTES # BLD AUTO: 1.2 10E3/UL (ref 0.8–5.3)
LYMPHOCYTES NFR BLD AUTO: 22 %
MCH RBC QN AUTO: 30.2 PG (ref 26.5–33)
MCHC RBC AUTO-ENTMCNC: 33.6 G/DL (ref 31.5–36.5)
MCV RBC AUTO: 90 FL (ref 78–100)
MONOCYTES # BLD AUTO: 0.6 10E3/UL (ref 0–1.3)
MONOCYTES NFR BLD AUTO: 11 %
NEUTROPHILS # BLD AUTO: 3.5 10E3/UL (ref 1.6–8.3)
NEUTROPHILS NFR BLD AUTO: 63 %
NRBC # BLD AUTO: 0 10E3/UL
NRBC BLD AUTO-RTO: 0 /100
PLATELET # BLD AUTO: 263 10E3/UL (ref 150–450)
POTASSIUM SERPL-SCNC: 3.3 MMOL/L (ref 3.4–5.3)
PROT SERPL-MCNC: 6.7 G/DL (ref 6.4–8.3)
RBC # BLD AUTO: 4.04 10E6/UL (ref 3.8–5.2)
RSV RNA SPEC NAA+PROBE: NEGATIVE
S PYO DNA THROAT QL NAA+PROBE: NOT DETECTED
SARS-COV-2 RNA RESP QL NAA+PROBE: POSITIVE
SODIUM SERPL-SCNC: 138 MMOL/L (ref 135–145)
WBC # BLD AUTO: 5.5 10E3/UL (ref 4–11)

## 2024-12-13 PROCEDURE — 80053 COMPREHEN METABOLIC PANEL: CPT | Performed by: PHYSICIAN ASSISTANT

## 2024-12-13 PROCEDURE — 93005 ELECTROCARDIOGRAM TRACING: CPT | Performed by: PHYSICIAN ASSISTANT

## 2024-12-13 PROCEDURE — 71046 X-RAY EXAM CHEST 2 VIEWS: CPT | Mod: 26 | Performed by: RADIOLOGY

## 2024-12-13 PROCEDURE — 93010 ELECTROCARDIOGRAM REPORT: CPT | Performed by: PHYSICIAN ASSISTANT

## 2024-12-13 PROCEDURE — 99285 EMERGENCY DEPT VISIT HI MDM: CPT | Mod: 25 | Performed by: PHYSICIAN ASSISTANT

## 2024-12-13 PROCEDURE — 84703 CHORIONIC GONADOTROPIN ASSAY: CPT | Performed by: PHYSICIAN ASSISTANT

## 2024-12-13 PROCEDURE — 71046 X-RAY EXAM CHEST 2 VIEWS: CPT

## 2024-12-13 PROCEDURE — 36415 COLL VENOUS BLD VENIPUNCTURE: CPT | Performed by: PHYSICIAN ASSISTANT

## 2024-12-13 PROCEDURE — 99284 EMERGENCY DEPT VISIT MOD MDM: CPT | Performed by: PHYSICIAN ASSISTANT

## 2024-12-13 PROCEDURE — 87637 SARSCOV2&INF A&B&RSV AMP PRB: CPT | Performed by: PHYSICIAN ASSISTANT

## 2024-12-13 PROCEDURE — 250N000013 HC RX MED GY IP 250 OP 250 PS 637: Performed by: PHYSICIAN ASSISTANT

## 2024-12-13 PROCEDURE — 85004 AUTOMATED DIFF WBC COUNT: CPT | Performed by: PHYSICIAN ASSISTANT

## 2024-12-13 PROCEDURE — 87651 STREP A DNA AMP PROBE: CPT | Performed by: PHYSICIAN ASSISTANT

## 2024-12-13 RX ORDER — KETOROLAC TROMETHAMINE 15 MG/ML
15 INJECTION, SOLUTION INTRAMUSCULAR; INTRAVENOUS ONCE
Status: DISCONTINUED | OUTPATIENT
Start: 2024-12-13 | End: 2024-12-13 | Stop reason: HOSPADM

## 2024-12-13 RX ADMIN — AMOXICILLIN AND CLAVULANATE POTASSIUM 1 TABLET: 875; 125 TABLET, FILM COATED ORAL at 20:49

## 2024-12-13 ASSESSMENT — ACTIVITIES OF DAILY LIVING (ADL)
ADLS_ACUITY_SCORE: 58

## 2024-12-13 ASSESSMENT — COLUMBIA-SUICIDE SEVERITY RATING SCALE - C-SSRS
2. HAVE YOU ACTUALLY HAD ANY THOUGHTS OF KILLING YOURSELF IN THE PAST MONTH?: NO
6. HAVE YOU EVER DONE ANYTHING, STARTED TO DO ANYTHING, OR PREPARED TO DO ANYTHING TO END YOUR LIFE?: NO
1. IN THE PAST MONTH, HAVE YOU WISHED YOU WERE DEAD OR WISHED YOU COULD GO TO SLEEP AND NOT WAKE UP?: NO

## 2024-12-13 NOTE — Clinical Note
Elizabeth Sam was seen and treated in our emergency department on 12/13/2024.  She may return to work on 12/16/2024.  Patient needs to stay home until improvement of symptoms for 24 hours.     If you have any questions or concerns, please don't hesitate to call.      Anju Alexis, NARENDRA

## 2024-12-13 NOTE — ED TRIAGE NOTES
"Chief Complaint: Patient ambulatory to triage for evaluation of flu-like syndromes for two days.    Onset of Symptoms: 12/11/24 PM    Home Remedies: Tylenol, last dose 1500    Triage Vital Signs: /82   Pulse 101   Temp 98  F (36.7  C) (Oral)   Resp 16   Ht 1.753 m (5' 9\")   Wt 68 kg (150 lb)   BMI 22.15 kg/m        Patrick Covington RN  December 13, 2024       Triage Assessment (Adult)       Row Name 12/13/24 1746          Triage Assessment    Airway WDL WDL        Respiratory WDL    Respiratory WDL WDL        Skin Circulation/Temperature WDL    Skin Circulation/Temperature WDL WDL        Cardiac WDL    Cardiac WDL WDL        Peripheral/Neurovascular WDL    Peripheral Neurovascular WDL WDL        Cognitive/Neuro/Behavioral WDL    Cognitive/Neuro/Behavioral WDL WDL                     "

## 2024-12-13 NOTE — ED PROVIDER NOTES
[place] EMERGENCY DEPARTMENT ([facility])    12/13/24       ED PROVIDER NOTE    History     Chief Complaint   Patient presents with    Headache    Fever    Pharyngitis     HPI  Elizabeth Sam is a 29 year old female with a past medical history of immunization deficiency, hepatitis B infection, H pylori infection, low back pain with bilateral sciatica, and preseptal cellulitis, who presents to the ED for evaluation of fever, headache, and pharyngitis.  Patient presents with male family member.   offered and declined during the initial portion of the history however in the results discussion portion of the encounter I did use a [facility] .    They collaborate on history.  Patient notes about 2 days ago she Sterisporin seeing bilateral ear pain left greater than right, headache, sore throat productive cough.  She is also felt feverish without any measured fevers at home.  She notes she did have some shortness of breath yesterday, without any shortness of breath here.  She denies any chest pain.  No hemoptysis history of DVT PE.  Use of contraception, unilateral leg swelling or pain.  She denies any GI symptoms or urinary symptoms.  No concern for pregnancy.  She denies chronic medical problems.  No significant neck pain.  She took Tylenol around 3:30 PM.    Past Medical History  History reviewed. No pertinent past medical history.  History reviewed. No pertinent surgical history.  amoxicillin-clavulanate (AUGMENTIN) 875-125 MG tablet  amoxicillin-clavulanate (AUGMENTIN) 875-125 MG tablet  polyethylene glycol (MIRALAX) 17 GM/Dose powder  sulfamethoxazole-trimethoprim (BACTRIM DS) 800-160 MG tablet      No Known Allergies  Family History  Family History   Problem Relation Age of Onset    Liver Disease Mother         Hepatitis B    Hypertension Father      Social History   Social History     Tobacco Use    Smoking status: Some Days     Types: Hookah   Substance Use Topics    Alcohol use:  "Not Currently    Drug use: Not Currently      A complete review of systems was performed with pertinent positives and negatives noted in the HPI, and all other systems negative.    Physical Exam   BP: 121/82  Pulse: 101  Temp: 98  F (36.7  C)  Resp: 16  Height: 175.3 cm (5' 9\")  Weight: 68 kg (150 lb)  SpO2: 97 %  Physical Exam      GENERAL APPEARANCE: The patient is well developed, well appearing, and in no acute distress.  HEAD:  Normocephalic and atraumatic.   EENT: Right TM without erythema bulging left TM with erythema and bulging consistent with otitis media.  Uvula midline without exudates, voice normal.  NECK: Trachea is midline.patient is able to laterally bend the neck to each side.  No meningismus.  LUNGS: Breath sounds are equal and clear bilaterally. No wheezes, rhonchi, or rales.  HEART: Regular rate and normal rhythm.    ABDOMEN: Soft, flat, and benign. No mass, tenderness, guarding, or rebound.  EXTREMITIES: No cyanosis, clubbing, or edema.  NEUROLOGIC: No focal sensory or motor deficits are noted.  PSYCHIATRIC: The patient is awake, alert.  Appropriate mood and affect.  SKIN: Warm, dry, and well perfused. Good turgor.      ED Course, Procedures, & Data        EKG 12/13/2024: Normal sinus rhythm, ventricular rate 98 QTc 436.  On my interpretation the rhythm is regular.  There is a P wave before every QRS complex.  No visible ST elevation or depression to suggest ischemia.     Results for orders placed or performed during the hospital encounter of 12/13/24   Chest XR,  PA & LAT     Status: None    Narrative    EXAM: XR CHEST 2 VIEWS  LOCATION: [facility]  DATE: 12/13/2024    INDICATION: cough, eval pneumonia  COMPARISON: None.      Impression    IMPRESSION: Negative chest.   Comprehensive metabolic panel     Status: Abnormal   Result Value Ref Range    Sodium 138 135 - 145 mmol/L    Potassium 3.3 (L) 3.4 - 5.3 mmol/L    Carbon Dioxide (CO2) 21 (L) 22 - 29 " mmol/L    Anion Gap 11 7 - 15 mmol/L    Urea Nitrogen 9.4 6.0 - 20.0 mg/dL    Creatinine 0.55 0.51 - 0.95 mg/dL    GFR Estimate >90 >60 mL/min/1.73m2    Calcium 9.0 8.8 - 10.4 mg/dL    Chloride 106 98 - 107 mmol/L    Glucose 87 70 - 99 mg/dL    Alkaline Phosphatase 91 40 - 150 U/L    AST 19 0 - 45 U/L    ALT 8 0 - 50 U/L    Protein Total 6.7 6.4 - 8.3 g/dL    Albumin 3.5 3.5 - 5.2 g/dL    Bilirubin Total 0.2 <=1.2 mg/dL   HCG qualitative pregnancy (blood)     Status: Normal   Result Value Ref Range    hCG Serum Qualitative Negative Negative   Influenza A/B, RSV and SARS-CoV2 PCR (COVID-19) Nose     Status: Abnormal    Specimen: Nose; Swab   Result Value Ref Range    Influenza A PCR Negative Negative    Influenza B PCR Negative Negative    RSV PCR Negative Negative    SARS CoV2 PCR Positive (A) Negative    Narrative    Testing was performed using the Xpert Xpress CoV2/Flu/RSV Assay on the Cepheid GeneXpert Instrument. This test should be ordered for the detection of SARS-CoV2, influenza, and RSV viruses in individuals with signs and symptoms of respiratory tract infection. This test is for in vitro diagnostic use under the US FDA for laboratories certified under CLIA to perform high or moderate complexity testing. This test has been US FDA cleared. A negative result does not rule out the presence of PCR inhibitors in the specimen or target RNA in concentration below the limit of detection for the assay. If only one viral target is positive but coinfection with multiple targets is suspected, the sample should be re-tested with another FDA cleared, approved, or authorized test, if coninfection would change clinical management. This test was validated by the [facility]. These laboratories are certified under the Clinical Laboratory Improvement Amendments of 1988 (CLIA-88) as qualified to perfom high complexity laboratory testing.   [place] Draw     Status: None    Narrative    The following orders  were created for panel order [place] Draw.  Procedure                               Abnormality         Status                     ---------                               -----------         ------                     Extra Blue Top Tube[[id number]]                              Final result                 Please view results for these tests on the individual orders.   CBC with platelets and differential     Status: None   Result Value Ref Range    WBC Count 5.5 4.0 - 11.0 10e3/uL    RBC Count 4.04 3.80 - 5.20 10e6/uL    Hemoglobin 12.2 11.7 - 15.7 g/dL    Hematocrit 36.3 35.0 - 47.0 %    MCV 90 78 - 100 fL    MCH 30.2 26.5 - 33.0 pg    MCHC 33.6 31.5 - 36.5 g/dL    RDW 12.6 10.0 - 15.0 %    Platelet Count 263 150 - 450 10e3/uL    % Neutrophils 63 %    % Lymphocytes 22 %    % Monocytes 11 %    % Eosinophils 3 %    % Basophils 1 %    % Immature Granulocytes 0 %    NRBCs per 100 WBC 0 <1 /100    Absolute Neutrophils 3.5 1.6 - 8.3 10e3/uL    Absolute Lymphocytes 1.2 0.8 - 5.3 10e3/uL    Absolute Monocytes 0.6 0.0 - 1.3 10e3/uL    Absolute Eosinophils 0.2 0.0 - 0.7 10e3/uL    Absolute Basophils 0.1 0.0 - 0.2 10e3/uL    Absolute Immature Granulocytes 0.0 <=0.4 10e3/uL    Absolute NRBCs 0.0 10e3/uL   Extra Blue Top Tube     Status: None   Result Value Ref Range    Hold Specimen [facility]    EKG 12 lead     Status: None (Preliminary result)   Result Value Ref Range    Systolic Blood Pressure  mmHg    Diastolic Blood Pressure  mmHg    Ventricular Rate 98 BPM    Atrial Rate 98 BPM    MA Interval 158 ms    QRS Duration 68 ms     ms    QTc 436 ms    P Axis 83 degrees    R AXIS 24 degrees    T Axis 41 degrees    Interpretation ECG       Sinus rhythm  Possible Left atrial enlargement  Septal infarct , age undetermined  Abnormal ECG     Group A Streptococcus PCR Throat Swab     Status: Normal    Specimen: Throat; Swab   Result Value Ref Range    Group A strep by PCR Not Detected Not Detected    Narrative    The Xpert Xpress Strep  A test, performed on the [employer]  Instrument Systems, is a rapid, qualitative in vitro diagnostic test for the detection of Streptococcus pyogenes (Group A ß-hemolytic Streptococcus, Strep A) in throat swab specimens from patients with signs and symptoms of pharyngitis. The Xpert Xpress Strep A test can be used as an aid in the diagnosis of Group A Streptococcal pharyngitis. The assay is not intended to monitor treatment for Group A Streptococcus infections. The Xpert Xpress Strep A test utilizes an automated real-time polymerase chain reaction (PCR) to detect Streptococcus pyogenes DNA.   CBC with platelets differential     Status: None    Narrative    The following orders were created for panel order CBC with platelets differential.  Procedure                               Abnormality         Status                     ---------                               -----------         ------                     CBC with platelets and d...[[id number]]                      Final result                 Please view results for these tests on the individual orders.     Medications   ketorolac (TORADOL) injection 15 mg (has no administration in time range)   amoxicillin-clavulanate (AUGMENTIN) 875-125 MG per tablet 1 tablet (1 tablet Oral $Given 12/13/24 2049)     Labs Ordered and Resulted from Time of ED Arrival to Time of ED Departure   COMPREHENSIVE METABOLIC PANEL - Abnormal       Result Value    Sodium 138      Potassium 3.3 (*)     Carbon Dioxide (CO2) 21 (*)     Anion Gap 11      Urea Nitrogen 9.4      Creatinine 0.55      GFR Estimate >90      Calcium 9.0      Chloride 106      Glucose 87      Alkaline Phosphatase 91      AST 19      ALT 8      Protein Total 6.7      Albumin 3.5      Bilirubin Total 0.2     INFLUENZA A/B, RSV AND SARS-COV2 PCR - Abnormal    Influenza A PCR Negative      Influenza B PCR Negative      RSV PCR Negative      SARS CoV2 PCR Positive (*)    HCG QUALITATIVE PREGNANCY - Normal    hCG Serum  Qualitative Negative     GROUP A STREPTOCOCCUS PCR THROAT SWAB - Normal    Group A strep by PCR Not Detected     CBC WITH PLATELETS AND DIFFERENTIAL    WBC Count 5.5      RBC Count 4.04      Hemoglobin 12.2      Hematocrit 36.3      MCV 90      MCH 30.2      MCHC 33.6      RDW 12.6      Platelet Count 263      % Neutrophils 63      % Lymphocytes 22      % Monocytes 11      % Eosinophils 3      % Basophils 1      % Immature Granulocytes 0      NRBCs per 100 WBC 0      Absolute Neutrophils 3.5      Absolute Lymphocytes 1.2      Absolute Monocytes 0.6      Absolute Eosinophils 0.2      Absolute Basophils 0.1      Absolute Immature Granulocytes 0.0      Absolute NRBCs 0.0       Chest XR,  PA & LAT   Final Result   IMPRESSION: Negative chest.             Critical care was not performed.     Medical Decision Making  The patient's presentation was of moderate complexity (an undiagnosed new problem with uncertain prognosis).    The patient's evaluation involved:  ordering and/or review of 3+ test(s) in this encounter (see separate area of note for details)    The patient's management necessitated moderate risk (prescription drug management including medications given in the ED) and moderate risk (limitations due to social determinants of health (language barrier)).    Assessment & Plan    This is a an otherwise healthy 29-year-old female presenting with constellation of ear pain headache sore throat and cough over the last several days.  Presentation to the department she is mildly tachycardic heart rate 101, normotensive, afebrile and normoxic.  She is nontoxic-appearing on exam, does have findings consistent with developing otitis media on the left side.  Throat reassuring without tonsillar exudates, hot potato voice or neck swelling to suggest deep space neck infection.  She has clear lungs.  Considered broad differential including viral URI, bronchitis, pneumonia, arrhythmia anemia electrolyte disturbance amongst  others.  Without chest pain low suspicion of ACS with patient's age and lack of cardiac risk factors.  Considered PE as well of lesser likelihood she is not currently having any shortness of breath or chest pain here has no significant VTE risk factors do not feel this needs to be pursued.    Will initiate broad workup including routine laboratory studies, pregnancy testing EKG strep COVID and chest x-ray evaluate for possible infiltrate.  Patient given Toradol while awaiting test results.    Initial EKG without arrhythmia or findings suggest ischemia.  CBC without leukocytosis or anemia.  Chemistry shows hypokalemia just below reference range 3.3.  Remainder of laboratory studies reassuring including negative pregnancy, negative strep, normal LFTs.  Patient does return positive for COVID-19.  Chest x-ray without consolidation on my read as well as radiologist interpretation.    Discussed with patient test results, at this time symptoms most appear consistent related to her current COVID-19 infection.  Discussed with her typical time course for viral illnesses as well as conservative measures, return precautions.  With her having findings for otitis media on exam discussed with her initiating Augmentin she was given initial dose here and paper prescription to resume remainder of antibiotic course tomorrow.  She is given a note for work as well as isolation recommendations.  We discussed return precautions.  Patient has no other questions or concerns at this time.  Red flag signs were addressed, and they were in agreement with the patient care plan provided.    I have reviewed the nursing notes. I have reviewed the findings, diagnosis, plan and need for follow up with the patient.    Discharge Medication List as of 12/13/2024  9:04 PM        START taking these medications    Details   !! amoxicillin-clavulanate (AUGMENTIN) 875-125 MG tablet Take 1 tablet by mouth 2 times daily for 7 days., Disp-14 tablet, R-0, Local  Print       !! - Potential duplicate medications found. Please discuss with provider.          Final diagnoses:   Left otitis media   COVID-19       REAL Tomlin  [facility] EMERGENCY DEPARTMENT  12/13/2024     Anju Alexis PA-C  12/13/24 2112

## 2024-12-14 LAB
ATRIAL RATE - MUSE: 98 BPM
DIASTOLIC BLOOD PRESSURE - MUSE: NORMAL MMHG
INTERPRETATION ECG - MUSE: NORMAL
P AXIS - MUSE: 83 DEGREES
PR INTERVAL - MUSE: 158 MS
QRS DURATION - MUSE: 68 MS
QT - MUSE: 342 MS
QTC - MUSE: 436 MS
R AXIS - MUSE: 24 DEGREES
SYSTOLIC BLOOD PRESSURE - MUSE: NORMAL MMHG
T AXIS - MUSE: 41 DEGREES
VENTRICULAR RATE- MUSE: 98 BPM

## 2024-12-14 NOTE — DISCHARGE INSTRUCTIONS
Here in the emergency room you have reassuring evaluation.  We discussed you tested positive for COVID-19.  He need to stay home until you are feeling better for 24 hours before you go back to work.  Work note given.  Recommend pushing fluids, Tylenol and ibuprofen, getting plenty of sleep.  He should be feeling better into the middle of next week.    He also have findings in your exam suggestive of an early ear infection on the left side.  You are given initial dose of antibiotic Augmentin and remainder prescription was given to you as a paper prescription you can take this to any pharmacy and to start this as directed.  Untreated ear infections can lead to scarring on the eardrum and hearing loss.    If you develop any new or worsening symptoms, is important to return right away to the emergency department for further evaluation and management.

## 2025-04-24 ENCOUNTER — HOSPITAL ENCOUNTER (EMERGENCY)
Facility: CLINIC | Age: 30
Discharge: HOME | End: 2025-04-24
Attending: EMERGENCY MEDICINE
Payer: MEDICAID

## 2025-04-24 VITALS
HEART RATE: 88 BPM | BODY MASS INDEX: 24.96 KG/M2 | OXYGEN SATURATION: 100 % | RESPIRATION RATE: 18 BRPM | TEMPERATURE: 97.9 F | SYSTOLIC BLOOD PRESSURE: 138 MMHG | HEIGHT: 65 IN | DIASTOLIC BLOOD PRESSURE: 80 MMHG

## 2025-04-24 DIAGNOSIS — M54.42 ACUTE LEFT-SIDED LOW BACK PAIN WITH LEFT-SIDED SCIATICA: ICD-10-CM
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
## 2025-04-24 DIAGNOSIS — S83.92XA SPRAIN OF LEFT KNEE, UNSPECIFIED LIGAMENT, INITIAL ENCOUNTER: ICD-10-CM

## 2025-04-24 PROCEDURE — 99283 EMERGENCY DEPT VISIT LOW MDM: CPT | Performed by: EMERGENCY MEDICINE

## 2025-04-24 PROCEDURE — 250N000013 HC RX MED GY IP 250 OP 250 PS 637: Performed by: EMERGENCY MEDICINE

## 2025-04-24 RX ORDER — METHOCARBAMOL 500 MG/1
500 TABLET, FILM COATED ORAL 4 TIMES DAILY PRN
Qty: 20 TABLET | Refills: 0 | Status: SHIPPED | OUTPATIENT
Start: 2025-04-24

## 2025-04-24 RX ORDER — LIDOCAINE 4 G/G
1 PATCH TOPICAL EVERY 24 HOURS
Qty: 10 PATCH | Refills: 0 | Status: SHIPPED | OUTPATIENT
Start: 2025-04-24

## 2025-04-24 RX ORDER — IBUPROFEN 200 MG
600 TABLET ORAL EVERY 6 HOURS PRN
Qty: 30 TABLET | Refills: 0 | Status: SHIPPED | OUTPATIENT
Start: 2025-04-24

## 2025-04-24 RX ORDER — IBUPROFEN 600 MG/1
600 TABLET, FILM COATED ORAL ONCE
Status: COMPLETED | OUTPATIENT
Start: 2025-04-24 | End: 2025-04-24

## 2025-04-24 RX ADMIN — IBUPROFEN 600 MG: 600 TABLET, FILM COATED ORAL at 23:24

## 2025-04-24 ASSESSMENT — COLUMBIA-SUICIDE SEVERITY RATING SCALE - C-SSRS
1. IN THE PAST MONTH, HAVE YOU WISHED YOU WERE DEAD OR WISHED YOU COULD GO TO SLEEP AND NOT WAKE UP?: NO
6. HAVE YOU EVER DONE ANYTHING, STARTED TO DO ANYTHING, OR PREPARED TO DO ANYTHING TO END YOUR LIFE?: NO
2. HAVE YOU ACTUALLY HAD ANY THOUGHTS OF KILLING YOURSELF IN THE PAST MONTH?: NO

## 2025-04-24 ASSESSMENT — ACTIVITIES OF DAILY LIVING (ADL): ADLS_ACUITY_SCORE: 58

## 2025-04-24 NOTE — LETTER
[facility] EMERGENCY DEPARTMENT  [address]  [place] [address]  [phone]      2025    Elizabeth Sam  [address]  [place] [address]  [phone] (home)     : 1995      To Whom it may concern:    Elizabeth Sam was seen in our Emergency Department today, 2025 for an injury that was reported to be work related.  She has a mild left knee strain as well as sciatica with left low back pain.    For the next 2 days she should not work.  For the subsequent 5 days she should be on light duty, so she should not lift anything greater than 10 pounds and should have minimal twisting/bending (less than 20% of the time).    Any further work restrictions need to come from her primary clinic.    Sincerely,        Shantell Mcnamara MD    Electronically signed

## 2025-04-25 NOTE — ED PROVIDER NOTES
ED Provider Note  [facility]      History     Chief Complaint   Patient presents with    Back Injury     A  was used ([employer]  was used for the entirety of this interaction).     Elizabeth Sam is a 30-year-old female who presents to the emergency department today complaining of back pain and left knee pain.  Patient states that she works at [employer], and on Sunday, 4 days ago, she was lifting a heavy box when she noted some pain in the left back and the left knee.  She states that she has had pain in the left low back which does tend to radiate down the buttock and down the left leg.  In addition she has noted pain in the left knee which makes it difficult for her to bend the knee.  The day of this injury she used ice pack which was somewhat helpful for her.  She has used Tylenol since then but still continues to have pain.  She does note some tingling/numbness particularly along the bottom of her left foot.  She denies falling or any other injuries.  No difficulty controlling bowel or bladder.  She has noticed a little bit of swelling to the left knee but denies any leg swelling.  Denies any weakness of the leg.  No previous surgery on the back or on the legs.    Patient denies fevers, chills, nasal congestion, sore throat, cough, shortness of breath, chest pain, abdominal pain, vomiting, or diarrhea.      Past Medical History  No past medical history on file.  No past surgical history on file.  ibuprofen (ADVIL/MOTRIN) 200 MG tablet  Lidocaine (LIDOCARE) 4 % Patch  methocarbamol (ROBAXIN) 500 MG tablet  amoxicillin-clavulanate (AUGMENTIN) 875-125 MG tablet  polyethylene glycol (MIRALAX) 17 GM/Dose powder  sulfamethoxazole-trimethoprim (BACTRIM DS) 800-160 MG tablet      No Known Allergies  Family History  Family History   Problem Relation Age of Onset    Liver Disease Mother         Hepatitis B    Hypertension Father      Social History   Social  "History     Tobacco Use    Smoking status: Some Days     Types: Hookah   Substance Use Topics    Alcohol use: Not Currently    Drug use: Not Currently      A medically appropriate review of systems was performed with pertinent positives and negatives noted in the HPI, and all other systems negative.    Physical Exam   BP: 114/78  Pulse: 88  Temp: 97.9  F (36.6  C)  Resp: 18  Height: 165.1 cm (5' 5\")  SpO2: 100 %  Physical Exam  ***    ED Course, Procedures, & Data      Procedures       {ED Course Selections (Optional):426926}  {ED Sepsis CMS Documentation (Optional):692796::\" \"}       No results found for any visits on 04/24/25.  Medications   ibuprofen (ADVIL/MOTRIN) tablet 600 mg (has no administration in time range)     Labs Ordered and Resulted from Time of ED Arrival to Time of ED Departure - No data to display  No orders to display          {Critical Care Performed?:224682}    Assessment & Plan    Patient presents for the above complaints.  On my evaluation she is alert, cooperative, appears fairly comfortable lying supine in bed, legs are crossed at the ankles.  On exam she has tenderness to palpation particularly medially along the left knee.  I do not appreciate much in the way of swelling, she is able to flex and extend at the knee with encouragement, she does have some hesitation doing self secondary to discomfort.  Distal CMS is intact.  The calf itself is not swollen.  Hip appears to be within normal limits.  Patient does have some tenderness to palpation over the lower lumbar spine particularly along the left paralumbar spinous musculature.    Suspect she probably has a knee sprain in addition to musculoskeletal low back pain potentially contributing to sciatica type picture given her report of radiation down the leg.  Do not believe that there is any need for imaging at this time.    I will recommend conservative treatment with lidocaine patch, typical precautions discussed.  She should also use " NSAIDs as this is best for this type of pain, she should take this with food.  I will also give her a prescription for methocarbamol to help with any muscle spasms, she was advised not to work or drive while taking this medication due to the potential sedating side effects.  She will be given a work note with restrictions, but she does need to follow-up with her primary clinic as any ongoing restrictions are going to need to come from her primary clinic, additionally her clinic may be able to assist her with referrals to physical therapy if symptoms are ongoing.  Patient verbalizes understanding.    I have reviewed the nursing notes. I have reviewed the findings, diagnosis, plan and need for follow up with the patient.    New Prescriptions    IBUPROFEN (ADVIL/MOTRIN) 200 MG TABLET    Take 3 tablets (600 mg) by mouth every 6 hours as needed for mild pain.    LIDOCAINE (LIDOCARE) 4 % PATCH    Place 1 patch over 12 hours onto the skin every 24 hours. To prevent lidocaine toxicity, patient should be patch free for 12 hrs daily.    METHOCARBAMOL (ROBAXIN) 500 MG TABLET    Take 1 tablet (500 mg) by mouth 4 times daily as needed for muscle spasms.       Final diagnoses:   Work related injury   Sprain of left knee, unspecified ligament, initial encounter   Acute left-sided low back pain with left-sided sciatica   This part of the medical record was transcribed by Елена Doe Medical Scribe, from a dictation done by Shantell Mcnamara MD.      Shantell Mcnamara MD  [facility] EMERGENCY DEPARTMENT  4/24/2025   for methocarbamol to help with any muscle spasms, she was advised not to work or drive while taking this medication due to the potential sedating side effects.  She will be given a work note with restrictions, but she does need to follow-up with her primary clinic as any ongoing restrictions are going to need to come from her primary clinic, additionally her clinic may be able to assist her with referrals to physical therapy if symptoms are ongoing.  Patient verbalizes understanding.    I have reviewed the nursing notes. I have reviewed the findings, diagnosis, plan and need for follow up with the patient.    Discharge Medication List as of 4/24/2025 11:22 PM        START taking these medications    Details   ibuprofen (ADVIL/MOTRIN) 200 MG tablet Take 3 tablets (600 mg) by mouth every 6 hours as needed for mild pain., Disp-30 tablet, R-0, Local PrintTake with food      Lidocaine (LIDOCARE) 4 % Patch Place 1 patch over 12 hours onto the skin every 24 hours. To prevent lidocaine toxicity, patient should be patch free for 12 hrs daily.Disp-10 patch, R-0Local Print      methocarbamol (ROBAXIN) 500 MG tablet Take 1 tablet (500 mg) by mouth 4 times daily as needed for muscle spasms., Disp-20 tablet, R-0, Local Print             Final diagnoses:   Work related injury   Sprain of left knee, unspecified ligament, initial encounter   Acute left-sided low back pain with left-sided sciatica   This part of the medical record was transcribed by Елена Doe Medical Scribe, from a dictation done by Shantell Mcnamara MD.      Shantell Mcnamara MD  [facility] EMERGENCY DEPARTMENT  4/24/2025     Shantell Mcnamara MD  04/25/25 0005

## 2025-04-25 NOTE — ED NOTES
Pt given discharge paperwork and instructions. All questions answered at this time. VSS. A&O x4. Ambulatory with steady gait.

## 2025-04-25 NOTE — DISCHARGE INSTRUCTIONS
You have been seen in the emergency department today for your symptoms.      You appear to have a mild sprain of your knee.  We recommend that you use ice on the knee and take ibuprofen to help with pain and inflammation.    You also have some pain of the low back with associated sciatica.  This means that there is irritation of the nerve going down the buttock into the leg.  We recommend that you use ibuprofen and a lidocaine patch to help with this pain.  For severe muscle spasms, you can use methocarbamol.  This medication can cause sedation, so you should not drink alcohol, drive a vehicle, or work while taking this.    We have given you a work note for work restrictions for the near future.  It is important that you follow-up with your primary care clinic as you are ultimately possibly going to need referral for physical therapy.  If any ongoing restrictions are necessary, this does need to come from your primary care clinic.

## 2025-04-26 ENCOUNTER — HOSPITAL ENCOUNTER (EMERGENCY)
Facility: CLINIC | Age: 30
Discharge: HOME | End: 2025-04-26
Admitting: PHYSICIAN ASSISTANT
Payer: MEDICAID

## 2025-04-26 ENCOUNTER — APPOINTMENT (OUTPATIENT)
Dept: GENERAL RADIOLOGY | Facility: CLINIC | Age: 30
End: 2025-04-26
Attending: PHYSICIAN ASSISTANT
Payer: MEDICAID

## 2025-04-26 VITALS
SYSTOLIC BLOOD PRESSURE: 119 MMHG | TEMPERATURE: 98.1 F | RESPIRATION RATE: 18 BRPM | DIASTOLIC BLOOD PRESSURE: 69 MMHG | OXYGEN SATURATION: 98 % | HEART RATE: 99 BPM

## 2025-04-26 DIAGNOSIS — S89.92XD LEFT KNEE INJURY, SUBSEQUENT ENCOUNTER: ICD-10-CM
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
## 2025-04-26 PROCEDURE — 99283 EMERGENCY DEPT VISIT LOW MDM: CPT | Performed by: PHYSICIAN ASSISTANT

## 2025-04-26 PROCEDURE — 96372 THER/PROPH/DIAG INJ SC/IM: CPT | Performed by: PHYSICIAN ASSISTANT

## 2025-04-26 PROCEDURE — 99284 EMERGENCY DEPT VISIT MOD MDM: CPT | Mod: 25 | Performed by: PHYSICIAN ASSISTANT

## 2025-04-26 PROCEDURE — 73560 X-RAY EXAM OF KNEE 1 OR 2: CPT | Mod: LT

## 2025-04-26 PROCEDURE — 73560 X-RAY EXAM OF KNEE 1 OR 2: CPT | Mod: 26 | Performed by: RADIOLOGY

## 2025-04-26 PROCEDURE — 250N000013 HC RX MED GY IP 250 OP 250 PS 637: Performed by: PHYSICIAN ASSISTANT

## 2025-04-26 PROCEDURE — 250N000011 HC RX IP 250 OP 636: Mod: JZ | Performed by: PHYSICIAN ASSISTANT

## 2025-04-26 RX ORDER — ACETAMINOPHEN 500 MG
1000 TABLET ORAL ONCE
Status: COMPLETED | OUTPATIENT
Start: 2025-04-26 | End: 2025-04-26

## 2025-04-26 RX ORDER — KETOROLAC TROMETHAMINE 15 MG/ML
15 INJECTION, SOLUTION INTRAMUSCULAR; INTRAVENOUS ONCE
Status: COMPLETED | OUTPATIENT
Start: 2025-04-26 | End: 2025-04-26

## 2025-04-26 RX ADMIN — ACETAMINOPHEN 1000 MG: 500 TABLET ORAL at 20:07

## 2025-04-26 RX ADMIN — KETOROLAC TROMETHAMINE 15 MG: 15 INJECTION, SOLUTION INTRAMUSCULAR; INTRAVENOUS at 20:07

## 2025-04-26 ASSESSMENT — ACTIVITIES OF DAILY LIVING (ADL)
ADLS_ACUITY_SCORE: 58
ADLS_ACUITY_SCORE: 58

## 2025-04-26 NOTE — Clinical Note
Elizabeth Sam was seen and treated in our emergency department on 4/26/2025.  She may return to work on 04/28/2025.  Patient may return to work however for the next 1 week or until additional recommendations from primary care patient is on restriction minimal weightbearing as tolerated on the left leg May use crutches and knee immobilizer.     If you have any questions or concerns, please don't hesitate to call.      Anju Alexis PA-C

## 2025-04-27 NOTE — ED TRIAGE NOTES
Pt c/o of L knee pain- cannot bear weight on leg. No known injury to the knee. Pain started increasing in intensity and now can't walk

## 2025-04-27 NOTE — DISCHARGE INSTRUCTIONS
Here in the emergency room your reassuring findings.  Your x-ray today does not show any visible broken bone or significant fluid in your joint space.  I suspect you may have a strain of your knee versus ligament injury versus meniscal injury.  It is importantly follow-up with primary care for follow-up as you may need additional management if this is not improving.  I placed a referral and they will be calling you.    You were given crutches a knee immobilizer you can use these as needed.  Recommend continuing Tylenol ice ibuprofen muscle relaxant lidocaine patch as needed.  You are given a note for work.    If you develop any new or worsening symptoms, is important to return right away to the emergency department for further evaluation and management.

## 2025-04-27 NOTE — ED PROVIDER NOTES
ED Provider Note  [facility]      History     Chief Complaint   Patient presents with    Knee Pain     HPI  Elizabeth Sam is a 30 year old female past medical history significant for chronic low back pain, overweight status who presents to the emergency room with concerns for left knee pain.    Per chart review patient was seen 2 days ago with concerns for back pain and left knee pain.  She had reassuring evaluation.  Symptoms were thought to be related to left knee sprain and possible sciatica.  She was discharged with ibuprofen, lidocaine patch, and Robaxin.  This was noted to be a work-related injury.    Patient presents here with her significant other.  She notes that since leaving the emergency room she is continue to have left knee pain.  It has gotten optically more painful over the last 1 day.  Pain is localized to the anterior medial aspect of the left knee.  Worse with weightbearing.  She denies any calf pain, distal pain in the foot or ankle.  She denies significant back pain or hip pain with this.  No new injury.  She denies any associated fevers, urinary symptoms, loss of bladder or bowel function or saddle anesthesia.  No history of knee issues.  She has been continuing to walk on the knee as well as taking the muscle relaxant, lidocaine patch as directed.  She has not yet followed up in clinic for her knee injury.    Past Medical History  No past medical history on file.  No past surgical history on file.  amoxicillin-clavulanate (AUGMENTIN) 875-125 MG tablet  ibuprofen (ADVIL/MOTRIN) 200 MG tablet  Lidocaine (LIDOCARE) 4 % Patch  methocarbamol (ROBAXIN) 500 MG tablet  polyethylene glycol (MIRALAX) 17 GM/Dose powder  sulfamethoxazole-trimethoprim (BACTRIM DS) 800-160 MG tablet      No Known Allergies  Family History  Family History   Problem Relation Age of Onset    Liver Disease Mother         Hepatitis B    Hypertension Father      Social History   Social History      Tobacco Use    Smoking status: Some Days     Types: Hookah   Substance Use Topics    Alcohol use: Not Currently    Drug use: Not Currently      A medically appropriate review of systems was performed with pertinent positives and negatives noted in the HPI, and all other systems negative.    Physical Exam   BP: 119/69  Pulse: 77  Temp: 98.1  F (36.7  C)  Resp: 18  SpO2: 98 %  Physical Exam  GENERAL APPEARANCE: The patient is well developed, well appearing, and in no acute distress.  HEAD:  Normocephalic.  EENT: Voice normal.  NECK: Trachea is midline.  Uvula midline without exudates  LUNGS: Breath sounds are equal and clear bilaterally. No wheezes, rhonchi, or rales.  HEART: Regular rate and normal rhythm.   EXTREMITIES: Left knee without obvious deformity.  Question some mild edema anteriorly.  Patient is able to minimally flex and extend the left knee passively limited due to pain.  She has tenderness palpation along the medial joint line.  No tenderness to the posterior calf, popliteal area.  She has no tenderness palpation of the left foot hip ankle.  PT pulses 2+ on the left.  Dano sensation intact distally digits 1 through 5 left foot she is able to move all digits of the left foot left ankle plantarflexion dorsiflexion and is intact strength 5 out of 5 left hip flexion.  NEUROLOGIC: No focal sensory or motor deficits are noted.  PSYCHIATRIC: The patient is awake, alert.  Appropriate mood and affect.  SKIN: Warm, dry, and well perfused.      ED Course, Procedures, & Data           Results for orders placed or performed during the hospital encounter of 04/26/25   XR Knee Left 1/2 Views     Status: None    Narrative    EXAM: XR KNEE LEFT 1/2 VIEWS  LOCATION: [facility]  DATE: 4/26/2025    INDICATION: Pain  COMPARISON: None.      Impression    IMPRESSION: Normal joint spaces and alignment. No fracture. No significant effusion.     Medications   acetaminophen  (TYLENOL) tablet 1,000 mg (1,000 mg Oral $Given 4/26/25 2007)   ketorolac (TORADOL) injection 15 mg (15 mg Intramuscular $Given 4/26/25 2007)     Labs Ordered and Resulted from Time of ED Arrival to Time of ED Departure - No data to display  XR Knee Left 1/2 Views   Final Result   IMPRESSION: Normal joint spaces and alignment. No fracture. No significant effusion.             Critical care was not performed.     Medical Decision Making  The patient's presentation was of low complexity (an acute and uncomplicated illness or injury).    The patient's evaluation involved:  review of external note(s) from 1 sources (see separate area of note for details)  ordering and/or review of 1 test(s) in this encounter (see separate area of note for details)    The patient's management necessitated moderate risk (limitations due to social determinants of health (language barrier)).    Assessment & Plan    This is a 30-year-old female presenting with concerns for ongoing/increasing left knee pain after a traumatic knee injury sustained at work several days prior.  She reports increasing pain despite conservative measures.  Vitals reassuring on presentation here.  On exam she has no obvious knee effusion.  The knee is not erythematous or significantly warm to suggest infected joint.  She has intact sensation and circulation distally.  No significant back pain to suggest radicular component of this.  Do not suspect DVT with exam findings and lack of calf involvement.  Plan for x-ray to evaluate possible occult fracture or bony process.  Patient given Tylenol and Toradol while awaiting test results.  X-rays were obtained reviewed and do not show any obvious fracture dislocation or significant effusion.  At this time suspect symptoms most likely related to sprain versus ligamentous injury.  Will plan for immobilizer crutches and follow-up with primary care.  Work note given.  We discussed importance of follow-up as patient may need  extension of her work note.  Recommended continuing conservative measures as well as return precautions.  Patient has no other questions or concerns at this time.  Red flag signs were addressed, and they were in agreement with the patient care plan provided.      I have reviewed the nursing notes. I have reviewed the findings, diagnosis, plan and need for follow up with the patient.    Discharge Medication List as of 4/26/2025  9:01 PM          Final diagnoses:   Left knee injury, subsequent encounter   Work related injury       REAL Tomlin  [facility] EMERGENCY DEPARTMENT  4/26/2025     Anju Alexis, PAWILLY  04/26/25 2146

## 2025-05-18 ENCOUNTER — HOSPITAL ENCOUNTER (OUTPATIENT)
Facility: CLINIC | Age: 30
Setting detail: OBSERVATION
Discharge: HOME | End: 2025-05-19
Attending: EMERGENCY MEDICINE | Admitting: STUDENT IN AN ORGANIZED HEALTH CARE EDUCATION/TRAINING PROGRAM
Payer: MEDICAID

## 2025-05-18 ENCOUNTER — APPOINTMENT (OUTPATIENT)
Dept: CT IMAGING | Facility: CLINIC | Age: 30
End: 2025-05-18
Attending: EMERGENCY MEDICINE
Payer: MEDICAID

## 2025-05-18 DIAGNOSIS — D50.9 IRON DEFICIENCY ANEMIA, UNSPECIFIED IRON DEFICIENCY ANEMIA TYPE: ICD-10-CM

## 2025-05-18 DIAGNOSIS — H70.92 MASTOIDITIS OF LEFT SIDE: ICD-10-CM

## 2025-05-18 DIAGNOSIS — J02.9 ACUTE PHARYNGITIS, UNSPECIFIED ETIOLOGY: ICD-10-CM

## 2025-05-18 DIAGNOSIS — H66.002 ACUTE SUPPURATIVE OTITIS MEDIA OF LEFT EAR WITHOUT SPONTANEOUS RUPTURE OF TYMPANIC MEMBRANE, RECURRENCE NOT SPECIFIED: ICD-10-CM

## 2025-05-18 DIAGNOSIS — E87.6 HYPOKALEMIA: Primary | ICD-10-CM

## 2025-05-18 DIAGNOSIS — L03.213 PRESEPTAL CELLULITIS: ICD-10-CM

## 2025-05-18 DIAGNOSIS — H92.03 OTALGIA OF BOTH EARS: ICD-10-CM

## 2025-05-18 LAB
ANION GAP SERPL CALCULATED.3IONS-SCNC: 10 MMOL/L (ref 7–15)
BASOPHILS # BLD AUTO: 0.1 10E3/UL (ref 0–0.2)
BASOPHILS NFR BLD AUTO: 1 %
BUN SERPL-MCNC: 7.1 MG/DL (ref 6–20)
CALCIUM SERPL-MCNC: 9.1 MG/DL (ref 8.8–10.4)
CHLORIDE SERPL-SCNC: 104 MMOL/L (ref 98–107)
CREAT SERPL-MCNC: 0.49 MG/DL (ref 0.51–0.95)
EGFRCR SERPLBLD CKD-EPI 2021: >90 ML/MIN/1.73M2
EOSINOPHIL # BLD AUTO: 0.1 10E3/UL (ref 0–0.7)
EOSINOPHIL NFR BLD AUTO: 1 %
ERYTHROCYTE [DISTWIDTH] IN BLOOD BY AUTOMATED COUNT: 12 % (ref 10–15)
GLUCOSE SERPL-MCNC: 77 MG/DL (ref 70–99)
HCG SERPL QL: NEGATIVE
HCO3 SERPL-SCNC: 25 MMOL/L (ref 22–29)
HCT VFR BLD AUTO: 37.7 % (ref 35–47)
HGB BLD-MCNC: 12.7 G/DL (ref 11.7–15.7)
IMM GRANULOCYTES # BLD: 0.1 10E3/UL
IMM GRANULOCYTES NFR BLD: 1 %
LYMPHOCYTES # BLD AUTO: 1.8 10E3/UL (ref 0.8–5.3)
LYMPHOCYTES NFR BLD AUTO: 28 %
MCH RBC QN AUTO: 31.2 PG (ref 26.5–33)
MCHC RBC AUTO-ENTMCNC: 33.7 G/DL (ref 31.5–36.5)
MCV RBC AUTO: 93 FL (ref 78–100)
MONOCYTES # BLD AUTO: 0.5 10E3/UL (ref 0–1.3)
MONOCYTES NFR BLD AUTO: 8 %
NEUTROPHILS # BLD AUTO: 4.1 10E3/UL (ref 1.6–8.3)
NEUTROPHILS NFR BLD AUTO: 62 %
NRBC # BLD AUTO: 0 10E3/UL
NRBC BLD AUTO-RTO: 0 /100
PLATELET # BLD AUTO: 344 10E3/UL (ref 150–450)
POTASSIUM SERPL-SCNC: 3.3 MMOL/L (ref 3.4–5.3)
RBC # BLD AUTO: 4.07 10E6/UL (ref 3.8–5.2)
SODIUM SERPL-SCNC: 139 MMOL/L (ref 135–145)
WBC # BLD AUTO: 6.6 10E3/UL (ref 4–11)

## 2025-05-18 PROCEDURE — 96375 TX/PRO/DX INJ NEW DRUG ADDON: CPT | Performed by: EMERGENCY MEDICINE

## 2025-05-18 PROCEDURE — 99223 1ST HOSP IP/OBS HIGH 75: CPT | Mod: FS | Performed by: STUDENT IN AN ORGANIZED HEALTH CARE EDUCATION/TRAINING PROGRAM

## 2025-05-18 PROCEDURE — 84703 CHORIONIC GONADOTROPIN ASSAY: CPT | Performed by: EMERGENCY MEDICINE

## 2025-05-18 PROCEDURE — 36415 COLL VENOUS BLD VENIPUNCTURE: CPT | Performed by: EMERGENCY MEDICINE

## 2025-05-18 PROCEDURE — 250N000013 HC RX MED GY IP 250 OP 250 PS 637: Performed by: EMERGENCY MEDICINE

## 2025-05-18 PROCEDURE — G0378 HOSPITAL OBSERVATION PER HR: HCPCS

## 2025-05-18 PROCEDURE — 99285 EMERGENCY DEPT VISIT HI MDM: CPT | Mod: 25 | Performed by: EMERGENCY MEDICINE

## 2025-05-18 PROCEDURE — 250N000013 HC RX MED GY IP 250 OP 250 PS 637

## 2025-05-18 PROCEDURE — 99285 EMERGENCY DEPT VISIT HI MDM: CPT | Performed by: EMERGENCY MEDICINE

## 2025-05-18 PROCEDURE — 96366 THER/PROPH/DIAG IV INF ADDON: CPT

## 2025-05-18 PROCEDURE — 250N000011 HC RX IP 250 OP 636: Mod: JZ | Performed by: EMERGENCY MEDICINE

## 2025-05-18 PROCEDURE — 99285 EMERGENCY DEPT VISIT HI MDM: CPT | Mod: 25

## 2025-05-18 PROCEDURE — 96365 THER/PROPH/DIAG IV INF INIT: CPT

## 2025-05-18 PROCEDURE — 96374 THER/PROPH/DIAG INJ IV PUSH: CPT | Performed by: EMERGENCY MEDICINE

## 2025-05-18 PROCEDURE — 80048 BASIC METABOLIC PNL TOTAL CA: CPT | Performed by: EMERGENCY MEDICINE

## 2025-05-18 PROCEDURE — 99418 PROLNG IP/OBS E/M EA 15 MIN: CPT | Mod: FS | Performed by: STUDENT IN AN ORGANIZED HEALTH CARE EDUCATION/TRAINING PROGRAM

## 2025-05-18 PROCEDURE — 96375 TX/PRO/DX INJ NEW DRUG ADDON: CPT

## 2025-05-18 PROCEDURE — 70480 CT ORBIT/EAR/FOSSA W/O DYE: CPT

## 2025-05-18 PROCEDURE — 70480 CT ORBIT/EAR/FOSSA W/O DYE: CPT | Mod: 26 | Performed by: RADIOLOGY

## 2025-05-18 PROCEDURE — 99207 PR APP CREDIT; MD BILLING SHARED VISIT: CPT | Mod: FS

## 2025-05-18 PROCEDURE — 85025 COMPLETE CBC W/AUTO DIFF WBC: CPT | Performed by: EMERGENCY MEDICINE

## 2025-05-18 RX ORDER — POTASSIUM CHLORIDE 750 MG/1
20 TABLET, EXTENDED RELEASE ORAL ONCE
Status: COMPLETED | OUTPATIENT
Start: 2025-05-18 | End: 2025-05-18

## 2025-05-18 RX ORDER — AMPICILLIN AND SULBACTAM 2; 1 G/1; G/1
3 INJECTION, POWDER, FOR SOLUTION INTRAMUSCULAR; INTRAVENOUS EVERY 6 HOURS
Status: DISCONTINUED | OUTPATIENT
Start: 2025-05-19 | End: 2025-05-19 | Stop reason: HOSPADM

## 2025-05-18 RX ORDER — AMPICILLIN AND SULBACTAM 2; 1 G/1; G/1
3 INJECTION, POWDER, FOR SOLUTION INTRAMUSCULAR; INTRAVENOUS ONCE
Status: COMPLETED | OUTPATIENT
Start: 2025-05-18 | End: 2025-05-19

## 2025-05-18 RX ORDER — ACETAMINOPHEN 650 MG/1
650 SUPPOSITORY RECTAL EVERY 4 HOURS PRN
Status: DISCONTINUED | OUTPATIENT
Start: 2025-05-18 | End: 2025-05-19 | Stop reason: HOSPADM

## 2025-05-18 RX ORDER — AMOXICILLIN 250 MG
2 CAPSULE ORAL 2 TIMES DAILY PRN
Status: DISCONTINUED | OUTPATIENT
Start: 2025-05-18 | End: 2025-05-19 | Stop reason: HOSPADM

## 2025-05-18 RX ORDER — NALOXONE HYDROCHLORIDE 0.4 MG/ML
0.4 INJECTION, SOLUTION INTRAMUSCULAR; INTRAVENOUS; SUBCUTANEOUS
Status: DISCONTINUED | OUTPATIENT
Start: 2025-05-18 | End: 2025-05-19 | Stop reason: HOSPADM

## 2025-05-18 RX ORDER — ACETAMINOPHEN 500 MG
1000 TABLET ORAL ONCE
Status: COMPLETED | OUTPATIENT
Start: 2025-05-18 | End: 2025-05-18

## 2025-05-18 RX ORDER — ONDANSETRON 4 MG/1
4 TABLET, ORALLY DISINTEGRATING ORAL EVERY 6 HOURS PRN
Status: DISCONTINUED | OUTPATIENT
Start: 2025-05-18 | End: 2025-05-19 | Stop reason: HOSPADM

## 2025-05-18 RX ORDER — OXYCODONE HYDROCHLORIDE 5 MG/1
5 TABLET ORAL ONCE
Refills: 0 | Status: COMPLETED | OUTPATIENT
Start: 2025-05-18 | End: 2025-05-18

## 2025-05-18 RX ORDER — OXYCODONE HYDROCHLORIDE 5 MG/1
5 TABLET ORAL EVERY 4 HOURS PRN
Refills: 0 | Status: DISCONTINUED | OUTPATIENT
Start: 2025-05-18 | End: 2025-05-19 | Stop reason: HOSPADM

## 2025-05-18 RX ORDER — AMOXICILLIN 250 MG
1 CAPSULE ORAL 2 TIMES DAILY PRN
Status: DISCONTINUED | OUTPATIENT
Start: 2025-05-18 | End: 2025-05-19 | Stop reason: HOSPADM

## 2025-05-18 RX ORDER — NALOXONE HYDROCHLORIDE 0.4 MG/ML
0.2 INJECTION, SOLUTION INTRAMUSCULAR; INTRAVENOUS; SUBCUTANEOUS
Status: DISCONTINUED | OUTPATIENT
Start: 2025-05-18 | End: 2025-05-19 | Stop reason: HOSPADM

## 2025-05-18 RX ORDER — PROCHLORPERAZINE MALEATE 10 MG
10 TABLET ORAL EVERY 6 HOURS PRN
Status: DISCONTINUED | OUTPATIENT
Start: 2025-05-18 | End: 2025-05-19 | Stop reason: HOSPADM

## 2025-05-18 RX ORDER — ACETAMINOPHEN 325 MG/1
650 TABLET ORAL EVERY 4 HOURS PRN
Status: DISCONTINUED | OUTPATIENT
Start: 2025-05-18 | End: 2025-05-19 | Stop reason: HOSPADM

## 2025-05-18 RX ORDER — IBUPROFEN 200 MG
600 TABLET ORAL EVERY 6 HOURS PRN
Status: DISCONTINUED | OUTPATIENT
Start: 2025-05-18 | End: 2025-05-19 | Stop reason: HOSPADM

## 2025-05-18 RX ORDER — ONDANSETRON 2 MG/ML
4 INJECTION INTRAMUSCULAR; INTRAVENOUS EVERY 6 HOURS PRN
Status: DISCONTINUED | OUTPATIENT
Start: 2025-05-18 | End: 2025-05-19 | Stop reason: HOSPADM

## 2025-05-18 RX ORDER — KETOROLAC TROMETHAMINE 15 MG/ML
15 INJECTION, SOLUTION INTRAMUSCULAR; INTRAVENOUS ONCE
Status: COMPLETED | OUTPATIENT
Start: 2025-05-18 | End: 2025-05-18

## 2025-05-18 RX ADMIN — OXYCODONE HYDROCHLORIDE 5 MG: 5 TABLET ORAL at 22:01

## 2025-05-18 RX ADMIN — POTASSIUM CHLORIDE 20 MEQ: 750 TABLET, EXTENDED RELEASE ORAL at 19:22

## 2025-05-18 RX ADMIN — IBUPROFEN 600 MG: 200 TABLET, FILM COATED ORAL at 22:01

## 2025-05-18 RX ADMIN — OXYCODONE HYDROCHLORIDE 5 MG: 5 TABLET ORAL at 19:22

## 2025-05-18 RX ADMIN — AMPICILLIN SODIUM AND SULBACTAM SODIUM 3 G: 2; 1 INJECTION, POWDER, FOR SOLUTION INTRAMUSCULAR; INTRAVENOUS at 18:55

## 2025-05-18 RX ADMIN — KETOROLAC TROMETHAMINE 15 MG: 15 INJECTION, SOLUTION INTRAMUSCULAR; INTRAVENOUS at 17:32

## 2025-05-18 RX ADMIN — Medication 3 MG: at 22:01

## 2025-05-18 RX ADMIN — ACETAMINOPHEN 1000 MG: 500 TABLET ORAL at 16:28

## 2025-05-18 ASSESSMENT — ACTIVITIES OF DAILY LIVING (ADL)
ADLS_ACUITY_SCORE: 37
ADLS_ACUITY_SCORE: 37
ADLS_ACUITY_SCORE: 58
ADLS_ACUITY_SCORE: 37

## 2025-05-18 ASSESSMENT — COLUMBIA-SUICIDE SEVERITY RATING SCALE - C-SSRS
6. HAVE YOU EVER DONE ANYTHING, STARTED TO DO ANYTHING, OR PREPARED TO DO ANYTHING TO END YOUR LIFE?: NO
1. IN THE PAST MONTH, HAVE YOU WISHED YOU WERE DEAD OR WISHED YOU COULD GO TO SLEEP AND NOT WAKE UP?: NO
2. HAVE YOU ACTUALLY HAD ANY THOUGHTS OF KILLING YOURSELF IN THE PAST MONTH?: NO

## 2025-05-18 NOTE — ED PROVIDER NOTES
[facility] EMERGENCY DEPARTMENT ([facility])    5/18/25       ED PROVIDER NOTE   History     Chief Complaint   Patient presents with    Jaw Pain     HPI  Elizabeth Sam is a 30 year old female with a history of mastoiditis who presents to the ED for bilateral ear pain. Patient states that this started 1 week ago. Patient saw [facility] ED for her pain initially on 5/16/2025 where she was diagnosed with Acute otitis media. She was prescribed Amoxicillin (BID) and advised to take Tylenol. Patient reports that since that ED visit 2 days ago, the pain has been getting worse with no relief taking her prescribed antibiotics. Patient notes pain is greater in L>R. Patient endorses not being able to lay down or chew food d/t severity of pain. Patient says she has had associated fevers and a sore throat. She says there is pain around her tonsils. Patient denies having this pain before. Denies any other medications. No contact with other sick people. No nausea or vomiting.       Past Medical History  No past medical history on file.  No past surgical history on file.  amoxicillin-clavulanate (AUGMENTIN) 875-125 MG tablet  ibuprofen (ADVIL/MOTRIN) 200 MG tablet  Lidocaine (LIDOCARE) 4 % Patch  methocarbamol (ROBAXIN) 500 MG tablet  polyethylene glycol (MIRALAX) 17 GM/Dose powder  sulfamethoxazole-trimethoprim (BACTRIM DS) 800-160 MG tablet      No Known Allergies  Family History  Family History   Problem Relation Age of Onset    Liver Disease Mother         Hepatitis B    Hypertension Father      Social History   Social History     Tobacco Use    Smoking status: Some Days     Types: Hookah   Substance Use Topics    Alcohol use: Not Currently    Drug use: Not Currently      A medically appropriate review of systems was performed with pertinent positives and negatives noted in the HPI, and all other systems negative.    Physical Exam   BP: 118/74  Pulse: 82  Temp: 98.4  F (36.9  C)  Resp: 20  Height: 170.2 cm (5'  "7\")  Weight: 68 kg (150 lb)  SpO2: 99 %  Physical Exam  Vitals and nursing note reviewed.   Constitutional:       General: She is not in acute distress.     Appearance: She is not toxic-appearing.   HENT:      Head: Normocephalic and atraumatic.      Right Ear: Tympanic membrane is bulging.      Left Ear: Tenderness present. There is mastoid tenderness. Tympanic membrane is erythematous.      Nose: Nose normal.   Eyes:      Conjunctiva/sclera: Conjunctivae normal.      Pupils: Pupils are equal, round, and reactive to light.   Cardiovascular:      Rate and Rhythm: Normal rate and regular rhythm.   Pulmonary:      Effort: Pulmonary effort is normal. No respiratory distress.      Breath sounds: No wheezing.   Musculoskeletal:         General: Normal range of motion.   Skin:     General: Skin is warm and dry.   Neurological:      General: No focal deficit present.      Mental Status: She is alert and oriented to person, place, and time.           ED Course, Procedures, & Data      Procedures           IV Antibiotics given and/or elevated Lactate of 0 and no sepsis note found - Delete this reminder and enter the sepsis note or '.edcms' before signing chart.>>>     Results for orders placed or performed during the hospital encounter of 05/18/25   CT Temporal Bones wo Contrast     Status: None    Narrative    CT TEMPORAL W/O CONTRAST 5/18/2025 5:35 PM    HISTORY: assessing for mastoiditis     COMPARISON: Head CT 7/5/2024     TECHNIQUE: Using multidetector thin collimation helical acquisition  technique, axial and coronal thin section CT images were reconstructed  through both temporal bones. Additional reconstructions performed in  the planes of Poschl and Stenver were also obtained. Images were  reviewed in a bone window.    FINDINGS:   Right temporal bone: Trace fluid in the mastoid air cells. Clear  external auditory canal. Tympanic membrane is thickened. Clear right  middle ear cavity. Intact bony ossicles. Clear " epitympanum. Sharp bony  scutum. Intact internal auditory canal and labyrinthine structures. No  lucency of the fissula antefenestrum. The vestibular aqueduct is not  enlarged. Facial nerve course appears normal.      Left temporal bone: Fluid throughout the left mastoid air cells and  trace fluid in the left middle ear cavity. Clear external auditory  canal. Tympanic membrane is intact. Clear right middle ear cavity.  Intact bony ossicles. Clear epitympanum. Sharp bony scutum. Intact  internal auditory canal and labyrinthine structures. No lucency of the  fissula antefenestrum. The vestibular aqueduct is not enlarged. Facial  nerve course appears normal.      Impression    IMPRESSION:  1. Opacification of the left mastoid air cells and fluid in the left  middle ear cavity, which may be seen with otomastoiditis.  2. 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    I have personally reviewed the examination and initial interpretation  and I agree with the findings.    YEVGENIY REGAN MD         SYSTEM ID:  [id number]   Basic metabolic panel     Status: Abnormal   Result Value Ref Range    Sodium 139 135 - 145 mmol/L    Potassium 3.3 (L) 3.4 - 5.3 mmol/L    Chloride 104 98 - 107 mmol/L    Carbon Dioxide (CO2) 25 22 - 29 mmol/L    Anion Gap 10 7 - 15 mmol/L    Urea Nitrogen 7.1 6.0 - 20.0 mg/dL    Creatinine 0.49 (L) 0.51 - 0.95 mg/dL    GFR Estimate >90 >60 mL/min/1.73m2    Calcium 9.1 8.8 - 10.4 mg/dL    Glucose 77 70 - 99 mg/dL   HCG qualitative pregnancy (blood)     Status: Normal   Result Value Ref Range    hCG Serum Qualitative Negative Negative   CBC with platelets and differential     Status: None   Result Value Ref Range    WBC Count 6.6 4.0 - 11.0 10e3/uL    RBC Count 4.07 3.80 - 5.20 10e6/uL    Hemoglobin 12.7 11.7 - 15.7 g/dL    Hematocrit 37.7 35.0 - 47.0 %    MCV 93 78 - 100 fL    MCH 31.2 26.5 - 33.0 pg    MCHC 33.7 31.5 - 36.5 g/dL    RDW 12.0 10.0 - 15.0 %    Platelet Count 344 150 - 450  10e3/uL    % Neutrophils 62 %    % Lymphocytes 28 %    % Monocytes 8 %    % Eosinophils 1 %    % Basophils 1 %    % Immature Granulocytes 1 %    NRBCs per 100 WBC 0 <1 /100    Absolute Neutrophils 4.1 1.6 - 8.3 10e3/uL    Absolute Lymphocytes 1.8 0.8 - 5.3 10e3/uL    Absolute Monocytes 0.5 0.0 - 1.3 10e3/uL    Absolute Eosinophils 0.1 0.0 - 0.7 10e3/uL    Absolute Basophils 0.1 0.0 - 0.2 10e3/uL    Absolute Immature Granulocytes 0.1 <=0.4 10e3/uL    Absolute NRBCs 0.0 10e3/uL   CBC with platelets differential     Status: None    Narrative    The following orders were created for panel order CBC with platelets differential.  Procedure                               Abnormality         Status                     ---------                               -----------         ------                     CBC with platelets and ...[[id number]]                      Final result                 Please view results for these tests on the individual orders.     Medications   pharmacy alert - intermittent dosing (has no administration in time range)   acetaminophen (TYLENOL) tablet 1,000 mg (1,000 mg Oral $Given 5/18/25 1628)   ketorolac (TORADOL) injection 15 mg (15 mg Intravenous $Given 5/18/25 1732)   ampicillin-sulbactam (UNASYN) 3 g vial to attach to  mL bag (3 g Intravenous $New Bag 5/18/25 1855)   potassium chloride maykel ER (KLOR-CON M10) CR tablet 20 mEq (20 mEq Oral $Given 5/18/25 1922)   oxyCODONE (ROXICODONE) tablet 5 mg (5 mg Oral $Given 5/18/25 1922)     Labs Ordered and Resulted from Time of ED Arrival to Time of ED Departure   BASIC METABOLIC PANEL - Abnormal       Result Value    Sodium 139      Potassium 3.3 (*)     Chloride 104      Carbon Dioxide (CO2) 25      Anion Gap 10      Urea Nitrogen 7.1      Creatinine 0.49 (*)     GFR Estimate >90      Calcium 9.1      Glucose 77     HCG QUALITATIVE PREGNANCY - Normal    hCG Serum Qualitative Negative     CBC WITH PLATELETS AND DIFFERENTIAL    WBC Count 6.6       RBC Count 4.07      Hemoglobin 12.7      Hematocrit 37.7      MCV 93      MCH 31.2      MCHC 33.7      RDW 12.0      Platelet Count 344      % Neutrophils 62      % Lymphocytes 28      % Monocytes 8      % Eosinophils 1      % Basophils 1      % Immature Granulocytes 1      NRBCs per 100 WBC 0      Absolute Neutrophils 4.1      Absolute Lymphocytes 1.8      Absolute Monocytes 0.5      Absolute Eosinophils 0.1      Absolute Basophils 0.1      Absolute Immature Granulocytes 0.1      Absolute NRBCs 0.0       CT Temporal Bones wo Contrast   Final Result   IMPRESSION:   1. Opacification of the left mastoid air cells and fluid in the left   middle ear cavity, which may be seen with otomastoiditis.   2. 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      I have personally reviewed the examination and initial interpretation   and I agree with the findings.      YEVGENIY REGAN MD            SYSTEM ID:  [id number]             Critical care was not performed.     Medical Decision Making  The patient's presentation was of high complexity (an acute health issue posing potential threat to life or bodily function).    The patient's evaluation involved:  review of external note(s) from 3+ sources (ED notes, clinic notes, nursing notes, discharge summary)  review of 3+ test result(s) ordered prior to this encounter (CBC, BMP, CT scan)  ordering and/or review of 3+ test(s) in this encounter (see separate area of note for details)  discussion of management or test interpretation with another health professional (see separate area of note for details)    The patient's management necessitated high risk (a decision regarding hospitalization).    Assessment & Plan    This is a 30-year-old female presenting with ear pain.  On arrival she is well-appearing and vitals are reassuring.  She was recently getting treated for otitis media with amoxicillin but has had worsening symptoms.  Differential included but limited to failed  treatment, mastoiditis, other acute process.  Labs were obtained and showed hypokalemia which was supplemented, no leukocytosis.  CT was obtained and showed signs of otomastoiditis.  ENT was consulted and the patient was given a dose of Unasyn.  No acute intervention per ENT.  Patient will be placed under observation on the medicine service for further antibiotics.  She is agreeable with this.  Patient was discussed with the hospitalist.    I have reviewed the nursing notes. I have reviewed the findings, diagnosis, plan and need for follow up with the patient.    New Prescriptions    No medications on file       Final diagnoses:   Mastoiditis of left side   I, Ino Snyder Her, am serving as a trained medical scribe to document services personally performed by Ze Borges MD, based on the provider's statements to me.     I, Ze Borges MD, was physically present and have reviewed and verified the accuracy of this note documented by Ino Fregoso.     Ze Borges MD  [facility] EMERGENCY DEPARTMENT  5/18/2025     Ze Borges MD  05/18/25 1927

## 2025-05-18 NOTE — ED TRIAGE NOTES
Elizabeth is seen in ED for bilateral ear and head pain 10/10  started 1 week ago. Was seen at the [facility] ER, given abx but thinks pain is getting worse. Denied drainage chills and fever. NVD,CP and SOB. Head/ ear pain is endorse by dizziness     Triage Assessment (Adult)       Row Name 05/18/25 1600          Triage Assessment    Airway WDL WDL        Respiratory WDL    Respiratory WDL WDL        Skin Circulation/Temperature WDL    Skin Circulation/Temperature WDL WDL        Cardiac WDL    Cardiac WDL WDL        Peripheral/Neurovascular WDL    Peripheral Neurovascular WDL WDL        Cognitive/Neuro/Behavioral WDL    Cognitive/Neuro/Behavioral WDL WDL

## 2025-05-18 NOTE — CONSULTS
Otolaryngology Consult Note  May 18, 2025      CC: Otomastoiditis  Consult by: MD Katerina    HPI: Elizabeth Sam is a 30 year old female with a history of mastoiditis who presents to the ED for bilateral ear pain. Patient states that this started 1 week ago. Patient saw [facility] ED for her pain initially on 5/16/2025 where she was diagnosed with Acute otitis media. She was prescribed Amoxicillin (BID) and advised to take Tylenol. Patient reports that since that ED visit 2 days ago, the pain has been getting worse with no relief taking her prescribed antibiotics. Patient notes pain is greater in L>R. She has had associated fevers and a sore throat with decreased PO intake as a result.     Denies drainage, tinnitus, vertigo. She did have some dizziness with fever a few days ago however this resolved on taking tylenol and she has not experienced it since. Does have hx of ear infections although she has not required tube placement in the past.    PMH:  No past medical history on file.    PSH:  No past surgical history on file.    Meds:  Current Outpatient Medications   Medication Sig Dispense Refill    amoxicillin-clavulanate (AUGMENTIN) 875-125 MG tablet Take 1 tablet by mouth 2 times daily 10 tablet 0    ibuprofen (ADVIL/MOTRIN) 200 MG tablet Take 3 tablets (600 mg) by mouth every 6 hours as needed for mild pain. 30 tablet 0    Lidocaine (LIDOCARE) 4 % Patch Place 1 patch over 12 hours onto the skin every 24 hours. To prevent lidocaine toxicity, patient should be patch free for 12 hrs daily. 10 patch 0    methocarbamol (ROBAXIN) 500 MG tablet Take 1 tablet (500 mg) by mouth 4 times daily as needed for muscle spasms. 20 tablet 0    polyethylene glycol (MIRALAX) 17 GM/Dose powder Take 17 g by mouth daily as needed for constipation      sulfamethoxazole-trimethoprim (BACTRIM DS) 800-160 MG tablet Take 1 tablet by mouth 2 times daily 10 tablet 0       Allergies:   No Known Allergies    Social hx:  Social History      Socioeconomic History    Marital status: Single     Spouse name: Not on file    Number of children: Not on file    Years of education: Not on file    Highest education level: Not on file   Occupational History    Not on file   Tobacco Use    Smoking status: Some Days     Types: Hookah    Smokeless tobacco: Not on file   Substance and Sexual Activity    Alcohol use: Not Currently    Drug use: Not Currently    Sexual activity: Not on file   Other Topics Concern    Not on file   Social History Narrative    Not on file     Social Drivers of Health     Financial Resource Strain: Low Risk  (2025)    Received from [facility]    Financial Resource Strain     Difficulty of Paying Living Expenses: 3     Difficulty of Paying Living Expenses: Not on file   Food Insecurity: No Food Insecurity (2025)    Received from [facility]    Food Insecurity     Do you worry your food will run out before you are able to buy more?: 1   Transportation Needs: No Transportation Needs (2025)    Received from [employer] [facility]    Transportation Needs     Does lack of transportation keep you from medical appointments?: 1     Does lack of transportation keep you from work, meetings or getting things that you need?: 1   Physical Activity: Not on File (2023)    Received from [employer]    Physical Activity     Physical Activity: 0   Stress: Not on File (2023)    Received from [employer]    Stress     Stress: 0   Social Connections: Socially Integrated (2025)    Received from [facility]    Social Connections     Do you often feel lonely or isolated from those around you?: 0   Interpersonal Safety: Not on file   Housing Stability: Not on File (2023)    Received from [employer]    Housing Stability     Housin       Family hx:  Not assessed  Family History   Problem Relation Age of Onset    Liver  "Disease Mother         Hepatitis B    Hypertension Father          PHYSICAL EXAM:  General: laying in bed, no acute distress  /74   Pulse 82   Temp 98.4  F (36.9  C) (Oral)   Resp 20   Ht 1.702 m (5' 7\")   Wt 68 kg (150 lb)   SpO2 99%   BMI 23.49 kg/m    HEAD: normocephalic, atraumatic  Face: symmetrical, CN VII intact bilaterally (HB 1), no swelling, edema, or erythema. Sensation V1-V3 intact and equal bilaterally.   Eyes: EOMI without spontaneous or gaze evoked nystagmus, PERRL, clear sclera  Ears: left: +tragal tenderness, EAC clear w/o drainage, TM thickened with purulent middle ear effusion; no displacement of pinna; no pain with palpation or soft tissue swelling/ballotable area posteriorly; right: external ear canal open and clear bilaterally, TM anatomically normal  Nose: no anterior drainage, intact and midline septum  Mouth: moist, no ulcers, no jaw or tooth tenderness, tongue midline and symmetric  Oropharynx: tonsils within normal limits, uvula midline, no oropharyngeal erythema  Neck: no LAD, trachea midline  Neuro: cranial nerves 2-12 grossly intact, no meningeal signs  Respiratory: breathing non-labored on RA, no stridor  Skin: no rashes or skin lesions of the face/neck  Cardio: extremities warm and well perfused     ROUTINE IP LABS (Last four results)  BMP  Recent Labs   Lab 25  1628      POTASSIUM 3.3*   CHLORIDE 104   WOLFGANG 9.1   CO2 25   BUN 7.1   CR 0.49*   GLC 77     CBC  Recent Labs   Lab 25  1628   WBC 6.6   RBC 4.07   HGB 12.7   HCT 37.7   MCV 93   MCH 31.2   MCHC 33.7   RDW 12.0          Imagin/18 temporal bone - independently reviewed and agree w radiology read  1. Opacification of the left mastoid air cells and fluid in the left  middle ear cavity, which may be seen with otomastoiditis.  2. 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    Assessment and Plan  Elizabeth Sam is a 29 yo female with no significant PMH who presents to the " ED with persistent left otitis media not responsive to Augmentin as prescribed by outside ENT. ENT was consulted with concern for mastoiditis. No sign of acute mastoiditis on clinical examination. Imaging reveals fluid in the mastoid, but since the mastoid is connected to the middle ear via the aditus ad antrum, this is to be expected and there are no concerning signs of coalescence.     Recommend:  -Admit to medicine service for IV antibiotics  -Outpatient audiogram upon patient discharge  -Follow up with ENT in 1-2 weeks  -Rest of cares per ED, medicine team  -ENT will sign off, please page with questions/concerns    Above plan to be discussed with staff Attending Dr. Garcia.    Amita King MD  Otolaryngology-Head & Neck Surgery  Please page ENT with questions by dialing [phone] and entering job code [phone] when prompted.

## 2025-05-19 VITALS
SYSTOLIC BLOOD PRESSURE: 124 MMHG | DIASTOLIC BLOOD PRESSURE: 86 MMHG | HEIGHT: 67 IN | OXYGEN SATURATION: 100 % | TEMPERATURE: 98.1 F | RESPIRATION RATE: 16 BRPM | BODY MASS INDEX: 23.54 KG/M2 | WEIGHT: 150 LBS | HEART RATE: 97 BPM

## 2025-05-19 LAB
ANION GAP SERPL CALCULATED.3IONS-SCNC: 9 MMOL/L (ref 7–15)
BUN SERPL-MCNC: 8.7 MG/DL (ref 6–20)
CALCIUM SERPL-MCNC: 8.6 MG/DL (ref 8.8–10.4)
CHLORIDE SERPL-SCNC: 107 MMOL/L (ref 98–107)
CREAT SERPL-MCNC: 0.52 MG/DL (ref 0.51–0.95)
EGFRCR SERPLBLD CKD-EPI 2021: >90 ML/MIN/1.73M2
ERYTHROCYTE [DISTWIDTH] IN BLOOD BY AUTOMATED COUNT: 12 % (ref 10–15)
GLUCOSE SERPL-MCNC: 130 MG/DL (ref 70–99)
HCO3 SERPL-SCNC: 24 MMOL/L (ref 22–29)
HCT VFR BLD AUTO: 33.8 % (ref 35–47)
HGB BLD-MCNC: 11.3 G/DL (ref 11.7–15.7)
IRON BINDING CAPACITY (ROCHE): 268 UG/DL (ref 240–430)
IRON SATN MFR SERPL: 10 % (ref 15–46)
IRON SERPL-MCNC: 26 UG/DL (ref 37–145)
MCH RBC QN AUTO: 30.7 PG (ref 26.5–33)
MCHC RBC AUTO-ENTMCNC: 33.4 G/DL (ref 31.5–36.5)
MCV RBC AUTO: 92 FL (ref 78–100)
PLATELET # BLD AUTO: 318 10E3/UL (ref 150–450)
POTASSIUM SERPL-SCNC: 4 MMOL/L (ref 3.4–5.3)
RBC # BLD AUTO: 3.68 10E6/UL (ref 3.8–5.2)
SODIUM SERPL-SCNC: 140 MMOL/L (ref 135–145)
WBC # BLD AUTO: 7.6 10E3/UL (ref 4–11)

## 2025-05-19 PROCEDURE — 93010 ELECTROCARDIOGRAM REPORT: CPT | Performed by: INTERNAL MEDICINE

## 2025-05-19 PROCEDURE — 250N000013 HC RX MED GY IP 250 OP 250 PS 637

## 2025-05-19 PROCEDURE — 96366 THER/PROPH/DIAG IV INF ADDON: CPT

## 2025-05-19 PROCEDURE — 96376 TX/PRO/DX INJ SAME DRUG ADON: CPT

## 2025-05-19 PROCEDURE — 99239 HOSP IP/OBS DSCHRG MGMT >30: CPT | Mod: FS | Performed by: STUDENT IN AN ORGANIZED HEALTH CARE EDUCATION/TRAINING PROGRAM

## 2025-05-19 PROCEDURE — 93005 ELECTROCARDIOGRAM TRACING: CPT

## 2025-05-19 PROCEDURE — 85014 HEMATOCRIT: CPT

## 2025-05-19 PROCEDURE — 250N000011 HC RX IP 250 OP 636

## 2025-05-19 PROCEDURE — 80048 BASIC METABOLIC PNL TOTAL CA: CPT

## 2025-05-19 PROCEDURE — 99239 HOSP IP/OBS DSCHRG MGMT >30: CPT | Mod: FS | Performed by: PHYSICIAN ASSISTANT

## 2025-05-19 PROCEDURE — 36415 COLL VENOUS BLD VENIPUNCTURE: CPT

## 2025-05-19 PROCEDURE — G0378 HOSPITAL OBSERVATION PER HR: HCPCS

## 2025-05-19 PROCEDURE — 83550 IRON BINDING TEST: CPT | Performed by: PHYSICIAN ASSISTANT

## 2025-05-19 RX ORDER — FERROUS SULFATE 325(65) MG
325 TABLET ORAL
Qty: 60 TABLET | Refills: 0 | Status: SHIPPED | OUTPATIENT
Start: 2025-05-19

## 2025-05-19 RX ORDER — ACETAMINOPHEN 325 MG/1
650 TABLET ORAL EVERY 4 HOURS PRN
COMMUNITY
Start: 2025-05-19

## 2025-05-19 RX ORDER — AMOXICILLIN AND CLAVULANATE POTASSIUM 562.5; 437.5; 62.5 MG/1; MG/1; MG/1
2 TABLET, MULTILAYER, EXTENDED RELEASE ORAL 2 TIMES DAILY
Qty: 32 TABLET | Refills: 0 | Status: SHIPPED | OUTPATIENT
Start: 2025-05-19 | End: 2025-05-27

## 2025-05-19 RX ADMIN — OXYCODONE HYDROCHLORIDE 5 MG: 5 TABLET ORAL at 05:17

## 2025-05-19 RX ADMIN — IBUPROFEN 600 MG: 200 TABLET, FILM COATED ORAL at 05:17

## 2025-05-19 RX ADMIN — ACETAMINOPHEN 650 MG: 325 TABLET ORAL at 03:43

## 2025-05-19 RX ADMIN — IBUPROFEN 600 MG: 200 TABLET, FILM COATED ORAL at 12:54

## 2025-05-19 RX ADMIN — AMPICILLIN SODIUM AND SULBACTAM SODIUM 3 G: 2; 1 INJECTION, POWDER, FOR SOLUTION INTRAMUSCULAR; INTRAVENOUS at 01:04

## 2025-05-19 RX ADMIN — AMPICILLIN SODIUM AND SULBACTAM SODIUM 3 G: 2; 1 INJECTION, POWDER, FOR SOLUTION INTRAMUSCULAR; INTRAVENOUS at 12:54

## 2025-05-19 RX ADMIN — AMPICILLIN SODIUM AND SULBACTAM SODIUM 3 G: 2; 1 INJECTION, POWDER, FOR SOLUTION INTRAMUSCULAR; INTRAVENOUS at 06:27

## 2025-05-19 RX ADMIN — ACETAMINOPHEN 650 MG: 325 TABLET ORAL at 08:54

## 2025-05-19 ASSESSMENT — ACTIVITIES OF DAILY LIVING (ADL)
ADLS_ACUITY_SCORE: 37

## 2025-05-19 NOTE — PROGRESS NOTES
"Pt's  walked down to RN station to inform this writer that the patient's pain was increasing.  Writer informed pt's  he would bring something for pain in a little bit.  This writer than went into the med room and had to wait for another RN to remove medications before accessing the [employer].  While waiting for access the patient's  used the call light and then came out of the room to look for this writer, informing me that the patient had \"throbbing neck pain\".  I informed the patient that I could not access the medication until the previous RN was done pulling medications for their patient and to go back to his room and wait.  This writer brought the patient APAP to treat her pain which she described as \"10/10 throbbing pain in both ears and down the left side of my neck\". Writer administered the medication and informed the patient that it would take 45-60 minutes to reach its full affect.  15 minutes after administering APAP, the patient's  walked out to the Nurses station again requesting this writer to heat up food for the patient that one of her relatives brought in for her.  Writer heated up her food and delivered it to the patient.  Writer asked patient about her pain and she stated it was \"getting much better\".  Pt currently in her room eating a meal.      "

## 2025-05-19 NOTE — DISCHARGE SUMMARY
[facility]  Hospitalist Discharge Summary      Date of Admission:  5/18/2025  Date of Discharge:  5/19/2025  Discharging Provider: ADAN Christianson/Dr. Angeles  Discharge Service: Hospitalist Service    Discharge Diagnoses   Left acute otitis medica   Bilateral ear pain   History of mastoiditis   Hypokalemia   Iron deficiency anemia   Palpitations     Clinically Significant Risk Factors          Follow-ups Needed After Discharge   Follow-up Appointments       ADULT [facility] Specialty Follow-up and recommended labs and tests      Follow up: Follow up with ENT, within 1-2 weeks of discharge for autiogram and AOM follow up.      Appointments on [place] and/or [facility] (with [facility] or [facility] [facility] provider or service). Call [phone] if you haven't heard regarding these appointments within 7 days of discharge.        Hospital to Primary Care - Establish PCP Referral      Please be aware that coverage of these services is subject to the terms and limitations of your health insurance plan.  Call member services at your health plan with any benefit or coverage questions.    Schedule Primary Care visit within: 7 Days               Unresulted Labs Ordered in the Past 30 Days of this Admission       No orders found for last 31 day(s).            Discharge Disposition   Discharged to home  Condition at discharge: Stable    Hospital Course   Elizabeth Sam is a 30 year old female admitted on 5/18/2025. She has PMH of mastoiditis and was recently diagnosed with AOM and started on Augmentin, however has been having ongoing worsening BL ear pain and fevers concerning for ongoing infection. She was admitted for IV antibiotics.     BL Ear Pain  Left otitis media  HX of Mastoiditis  Patient has persistent left otitis media despite being on Augmentin 875-125 mg BID outpatient since 5/16. Pain is worse on the left side, made it difficult to lay down, and chew/eat. Also reports  associated fevers and sort throat. She was seen by ENT in the ED given concern for mastoiditis, however exam was reassuring. CT temporal on admit with fluid in the left mastoid, though this is an expected finding per ENT and no concern for coalescence. She was started on Unasyn in the ED and continued until discharge. Pain starting improving and patient was able to tolerate regular diet.   -Transitioned Unasyn to high dose Augmentin 2000-125 mg BID on discharge for 8 additional days   -Follow up with PCP within one week for re-assessment   -ENT referral to be seen in 1-2 weeks for AOM and audiogram outpatient   -Pain management with tylenol and ibuprofen PRN   -Discussed return precautions such as worsening pain, blood or drainage from ear, fevers, nausea, vomiting, or other concerns      Hypokalemia, mild; resolved   K 3.3 on admit, replaced orally with repeat K 4.0.     Palpitations   Patient reported palpitations 5/19 AM with associated intermittent dizziness. Notes poor sleep while in the ED and observation stay. EKG with normal sinus rhythm, no ischemic changes.    -Encouraged adequate nutrition and rest   -Treatment of infection as above   -Start iron supplement as below   -Follow up with PCP for additional workup if persistent     Iron deficiency anemia   Hgb 11.3. MCV 92. Iron studies consistent with iron deficiency.   -Start ferrous sulfate 325 mg daily   -Follow up with PCP for ongoing monitoring and management         Consultations This Hospital Stay   None    Code Status   Full Code    Time Spent on this Encounter   IKimi PA, personally saw the patient today and spent greater than 30 minutes discharging this patient.       ADAN Christianson  [facility] [address] OBSERVATION  [address] [address]  Phone: [phone]  Fax: [phone]  ______________________________________________________________________    Physical Exam   Blood pressure 124/86,  "pulse 97, temperature 98.1  F (36.7  C), temperature source Oral, resp. rate 16, height 1.702 m (5' 7\"), weight 68 kg (150 lb), SpO2 100%.  GENERAL: Alert and oriented x 3. NAD. Appears fatigued.   HEENT: Anicteric sclera. PERRL. Mucous membranes moist and without lesions.   Ears: Left- tragal tenderness with palpation, EAC clear without drainage, TM intact with purulent middle ear effusion. Right - EAC clear without drainage, TM anatomically normal.   CV: RRR. S1, S2. No murmurs appreciated.   RESPIRATORY: Effort normal on RA. Lungs CTAB with no wheezing, rales, rhonchi.   GI: Abdomen soft and non distended, bowel sounds present. No tenderness, rebound, guarding.   MUSCULOSKELETAL: Moves all extremities.   NEUROLOGICAL: No focal deficits.  EXTREMITIES: No peripheral edema. Intact bilateral pedal pulses.   SKIN: No jaundice. No rashes.          Primary Care Physician   Physician No Ref-Primary    Discharge Orders      Hospital to Primary Care - Establish PCP Referral      Adult ENT  Referral      Reason for your hospital stay    You were hospitalized under observation at [facility] of otitis medica (inner ear infection). You were given IV antibiotics and evaluated by ENT providers. Your pain improved and you are ready to discharge to home on ongoing oral antibiotics. Please follow up with ENT in 1-2 weeks.     Activity    Your activity upon discharge: activity as tolerated. No driving if taking narcotic pain medications (oxycodone).     ADULT [facility] Specialty Follow-up and recommended labs and tests    Follow up: Follow up with ENT, within 1-2 weeks of discharge for autiogram and AOM follow up.      Appointments on [place] and/or [facility] (with [facility] or [facility] provider or service). Call [phone] if you haven't heard regarding these appointments within 7 days of discharge.     Diet    Follow this diet upon discharge:       Regular Diet Adult       Significant Results and Procedures   Most " Recent 3 CBC's:  Recent Labs   Lab Test 05/19/25  0626 05/18/25  1628 12/13/24  1934   WBC 7.6 6.6 5.5   HGB 11.3* 12.7 12.2   MCV 92 93 90    344 263     Most Recent 3 BMP's:  Recent Labs   Lab Test 05/19/25  0626 05/18/25  1628 12/13/24  1934    139 138   POTASSIUM 4.0 3.3* 3.3*   CHLORIDE 107 104 106   CO2 24 25 21*   BUN 8.7 7.1 9.4   CR 0.52 0.49* 0.55   ANIONGAP 9 10 11   WOLFGANG 8.6* 9.1 9.0   * 77 87   ,   Results for orders placed or performed during the hospital encounter of 05/18/25   CT Temporal Bones wo Contrast    Narrative    CT TEMPORAL W/O CONTRAST 5/18/2025 5:35 PM    HISTORY: assessing for mastoiditis     COMPARISON: Head CT 7/5/2024     TECHNIQUE: Using multidetector thin collimation helical acquisition  technique, axial and coronal thin section CT images were reconstructed  through both temporal bones. Additional reconstructions performed in  the planes of Poschl and Stenver were also obtained. Images were  reviewed in a bone window.    FINDINGS:   Right temporal bone: Trace fluid in the mastoid air cells. Clear  external auditory canal. Tympanic membrane is thickened. Clear right  middle ear cavity. Intact bony ossicles. Clear epitympanum. Sharp bony  scutum. Intact internal auditory canal and labyrinthine structures. No  lucency of the fissula antefenestrum. The vestibular aqueduct is not  enlarged. Facial nerve course appears normal.      Left temporal bone: Fluid throughout the left mastoid air cells and  trace fluid in the left middle ear cavity. Clear external auditory  canal. Tympanic membrane is intact. Clear right middle ear cavity.  Intact bony ossicles. Clear epitympanum. Sharp bony scutum. Intact  internal auditory canal and labyrinthine structures. No lucency of the  fissula antefenestrum. The vestibular aqueduct is not enlarged. Facial  nerve course appears normal.      Impression    IMPRESSION:  1. Opacification of the left mastoid air cells and fluid in the  left  middle ear cavity, which may be seen with otomastoiditis.  2. 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    I have personally reviewed the examination and initial interpretation  and I agree with the findings.    YEVGENIY REGAN MD         SYSTEM ID:  [id number]       Discharge Medications   Current Discharge Medication List        START taking these medications    Details   acetaminophen (TYLENOL) 325 MG tablet Take 2 tablets (650 mg) by mouth every 4 hours as needed for mild pain (and adjunct with moderate or severe pain or per patient request).    Associated Diagnoses: Acute suppurative otitis media of left ear without spontaneous rupture of tympanic membrane, recurrence not specified      amoxicillin-clavulanate (AUGMENTIN XR) 1000-62.5 MG 12 hr tablet Take 2 tablets by mouth 2 times daily for 8 days.  Qty: 32 tablet, Refills: 0    Associated Diagnoses: Acute suppurative otitis media of left ear without spontaneous rupture of tympanic membrane, recurrence not specified      ferrous sulfate (FEROSUL) 325 (65 Fe) MG tablet Take 1 tablet (325 mg) by mouth daily (with breakfast).  Qty: 60 tablet, Refills: 0    Associated Diagnoses: Iron deficiency anemia, unspecified iron deficiency anemia type           CONTINUE these medications which have NOT CHANGED    Details   ibuprofen (ADVIL/MOTRIN) 200 MG tablet Take 3 tablets (600 mg) by mouth every 6 hours as needed for mild pain.  Qty: 30 tablet, Refills: 0    Comments: Take with food      methocarbamol (ROBAXIN) 500 MG tablet Take 1 tablet (500 mg) by mouth 4 times daily as needed for muscle spasms.  Qty: 20 tablet, Refills: 0      polyethylene glycol (MIRALAX) 17 GM/Dose powder Take 17 g by mouth daily as needed for constipation    Associated Diagnoses: Constipation, unspecified constipation type           STOP taking these medications       amoxicillin-clavulanate (AUGMENTIN) 875-125 MG tablet Comments:   Reason for Stopping:         Lidocaine  (LIDOCARE) 4 % Patch Comments:   Reason for Stopping:         sulfamethoxazole-trimethoprim (BACTRIM DS) 800-160 MG tablet Comments:   Reason for Stopping:             Allergies   No Known Allergies

## 2025-05-19 NOTE — PROGRESS NOTES
Pt arrived on the unit from [facility] via wheelchair and was settled in her room.

## 2025-05-19 NOTE — H&P
[facility]    History and Physical - Hospitalist Service, GOLD TEAM        Date of Admission:  5/18/2025    Assessment & Plan      Elizabeth Sam is a 30 year old female admitted on 5/18/2025. She has PMH of mastoiditis and was recently diagnosed with AOM and started on antibiotics, however has been having ongoing worsening BL ear pain and fevers concerning for ongoing infection. Will be admitted for IV abx.    BL Ear Pain  Left otitis media  HX of Mastoiditis  Patient has persistent left otitis media despite being on Augmentin outpatient since 5/16. Pain is worse on the left side, makes it difficult to lay down, and chew/eat. Also reports associated fevers and sort throat. She was seen by ENT in the ED given concern for mastoiditis, however exam was reassuring. CT temporal on admit with fluid in the left mastoid, though this is an expected finding per ENT and no concern for coalescence.  -- Unasyn 3g q6 for now per ENT rec  -- ENT consulted   > Audiogram outpatient   > OP ENT follow up in 1-2 weeks  -- PRN Tylenol, Ibuprofen, and Oxycodone for breakthrough  -- PRN Melatonin    Hypokalemia, mild  K 3.3 on admit, replaced orally  -- Repeat BMP in AM        Observation Goals: -diagnostic tests and consults completed and resulted, -vital signs normal or at patient baseline, -tolerating oral intake to maintain hydration, -adequate pain control on oral analgesics, -infection is improving, -safe disposition plan has been identified, Nurse to notify provider when observation goals have been met and patient is ready for discharge.  Diet: Regular Diet Adult  DVT Prophylaxis: Low Risk/Ambulatory with no VTE prophylaxis indicated  Manzo Catheter: Not present  Lines: None     Cardiac Monitoring: None  Code Status: Full Code     Clinically Significant Risk Factors Present on Admission        # Hypokalemia: Lowest K = 3.3 mmol/L in last 2 days, will replace as needed                                  Disposition Plan     Medically Ready for Discharge: 1-2 days         The patient's care was discussed with the Attending Physician, Dr. Schmidt, Bedside Nurse, and Patient.    Patrizia English PA-C  Hospitalist Service, [facility]  Securely message with [employer] (more info)  Text page via [facility] Paging/Directory   See signed in provider for up to date coverage information    ______________________________________________________________________    Chief Complaint   Ear pain, fever    History is obtained from the patient    History of Present Illness   Elizabeth Sam is a 30 year old female admitted on 5/18/2025. She has PMH of mastoiditis and was recently diagnosed with AOM and started on antibiotics, however has been having ongoing worsening BL ear pain and fevers concerning for ongoing infection. Will be admitted for IV abx.    Patient has persistent left otitis media despite being on Augmentin outpatient since 5/16. Pain is worse on the left side, makes it difficult to lay down, and chew/eat. Also reports associated fevers and sort throat. Denies N/V. No other long term medical problems or daily medications. Reports she hasn't really slept the last two days.      Past Medical History    No past medical history on file.    Past Surgical History   No past surgical history on file.    Prior to Admission Medications   Prior to Admission Medications   Prescriptions Last Dose Informant Patient Reported? Taking?   Lidocaine (LIDOCARE) 4 % Patch   No No   Sig: Place 1 patch over 12 hours onto the skin every 24 hours. To prevent lidocaine toxicity, patient should be patch free for 12 hrs daily.   amoxicillin-clavulanate (AUGMENTIN) 875-125 MG tablet   No No   Sig: Take 1 tablet by mouth 2 times daily   ibuprofen (ADVIL/MOTRIN) 200 MG tablet   No No   Sig: Take 3 tablets (600 mg) by mouth every 6 hours as needed for mild pain.   methocarbamol  (ROBAXIN) 500 MG tablet   No No   Sig: Take 1 tablet (500 mg) by mouth 4 times daily as needed for muscle spasms.   polyethylene glycol (MIRALAX) 17 GM/Dose powder   No No   Sig: Take 17 g by mouth daily as needed for constipation   sulfamethoxazole-trimethoprim (BACTRIM DS) 800-160 MG tablet   No No   Sig: Take 1 tablet by mouth 2 times daily      Facility-Administered Medications: None        Review of Systems    The 5 point Review of Systems is negative other than noted in the HPI or here.      Physical Exam   Vital Signs: Temp: 98.2  F (36.8  C) Temp src: Oral BP: 117/71 Pulse: 83   Resp: 16 SpO2: 100 % O2 Device: None (Room air)    Weight: 150 lbs 0 oz    Physical Exam  Vitals reviewed.   Constitutional:       General: She is not in acute distress.     Appearance: She is not diaphoretic.   HENT:      Head:      Comments: TTP preauricular left side, no external drainage or significant edema  Cardiovascular:      Rate and Rhythm: Normal rate and regular rhythm.   Pulmonary:      Effort: Pulmonary effort is normal. No respiratory distress.      Breath sounds: No wheezing, rhonchi or rales.   Skin:     General: Skin is warm and dry.   Neurological:      Mental Status: She is alert and oriented to person, place, and time.           Medical Decision Making       45 MINUTES SPENT BY ME on the date of service doing chart review, history, exam, documentation & further activities per the note.      Data     I have personally reviewed the following data over the past 24 hrs:    6.6  \   12.7   / 344     139 104 7.1 /  77   3.3 (L) 25 0.49 (L) \       Imaging results reviewed over the past 24 hrs:   Recent Results (from the past 24 hours)   CT Temporal Bones wo Contrast    Narrative    CT TEMPORAL W/O CONTRAST 5/18/2025 5:35 PM    HISTORY: assessing for mastoiditis     COMPARISON: Head CT 7/5/2024     TECHNIQUE: Using multidetector thin collimation helical acquisition  technique, axial and coronal thin section CT images  were reconstructed  through both temporal bones. Additional reconstructions performed in  the planes of Poschl and Stenver were also obtained. Images were  reviewed in a bone window.    FINDINGS:   Right temporal bone: Trace fluid in the mastoid air cells. Clear  external auditory canal. Tympanic membrane is thickened. Clear right  middle ear cavity. Intact bony ossicles. Clear epitympanum. Sharp bony  scutum. Intact internal auditory canal and labyrinthine structures. No  lucency of the fissula antefenestrum. The vestibular aqueduct is not  enlarged. Facial nerve course appears normal.      Left temporal bone: Fluid throughout the left mastoid air cells and  trace fluid in the left middle ear cavity. Clear external auditory  canal. Tympanic membrane is intact. Clear right middle ear cavity.  Intact bony ossicles. Clear epitympanum. Sharp bony scutum. Intact  internal auditory canal and labyrinthine structures. No lucency of the  fissula antefenestrum. The vestibular aqueduct is not enlarged. Facial  nerve course appears normal.      Impression    IMPRESSION:  1. Opacification of the left mastoid air cells and fluid in the left  middle ear cavity, which may be seen with otomastoiditis.  2. 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    I have personally reviewed the examination and initial interpretation  and I agree with the findings.    YEVGENIY REGAN MD         SYSTEM ID:  [id number]

## 2025-05-19 NOTE — PLAN OF CARE
Goal Outcome Evaluation:    DISCHARGE                         No discharge date for patient encounter.  ----------------------------------------------------------------------------  Discharged to: Home  Via: Automobile  Accompanied by: Family  Discharge Instructions: diet, activity, medications, follow up appointments, when to call the MD, aftercare instructions, and what to watchout for (i.e. s/s of infection, increasing SOB, palpitations, chest pain,)  Prescriptions: To be filled by  [facility]'s pharmacy per pt's request; medication list reviewed & sent with pt  Follow Up Appointments: arranged; information given  Belongings: All sent with pt  IV: out    Pt exhibits understanding of above discharge instructions; all questions answered. Had families in room during discharge instructions.     Discharge Paperwork: copied, and sent home with patient.

## 2025-05-19 NOTE — PLAN OF CARE
"Goal Outcome Evaluation:    /85 (BP Location: Left arm)   Pulse 74   Temp 97.8  F (36.6  C) (Oral)   Resp 16   Ht 1.702 m (5' 7\")   Wt 68 kg (150 lb)   SpO2 97%   BMI 23.49 kg/m      -diagnostic tests and consults completed and resulted - MET  -vital signs normal or at patient baseline - MET  -tolerating oral intake to maintain hydration - MET  -adequate pain control on oral analgesics - MET  -infection is improving - unasyn continued  -safe disposition plan has been identified - MET  "

## 2025-05-19 NOTE — PROGRESS NOTES
"Observation Goals:    -diagnostic tests and consults completed and resulted. Met   -vital signs normal or at patient baseline. Met /71 (BP Location: Left arm)   Pulse 83   Temp 98.2  F (36.8  C) (Oral)   Resp 16   Ht 1.702 m (5' 7\")   Wt 68 kg (150 lb)   SpO2 100%   BMI 23.49 kg/m    -tolerating oral intake to maintain hydration. Met    -adequate pain control on oral analgesics. Met   -infection is improving. In process    -safe disposition plan has been identified. Met   "

## 2025-05-19 NOTE — PLAN OF CARE
"Goal Outcome Evaluation:    /56 (BP Location: Left arm)   Pulse 86   Temp 97.8  F (36.6  C) (Oral)   Resp 16   Ht 1.702 m (5' 7\")   Wt 68 kg (150 lb)   SpO2 98%   BMI 23.49 kg/m      -diagnostic tests and consults completed and resulted - MET  -vital signs normal or at patient baseline - MET  -tolerating oral intake to maintain hydration - MET  -adequate pain control on oral analgesics - MET  -infection is improving - pending  -safe disposition plan has been identified - MET  "

## 2025-05-19 NOTE — PROGRESS NOTES
Observation Goals:    -diagnostic tests and consults completed and resulted. Met   -vital signs normal or at patient baseline. Met   -tolerating oral intake to maintain hydration. Met    -adequate pain control on oral analgesics. Met   -infection is improving. In process    -safe disposition plan has been identified. Met

## 2025-05-20 ENCOUNTER — PATIENT OUTREACH (OUTPATIENT)
Dept: CARE COORDINATION | Facility: CLINIC | Age: 30
End: 2025-05-20
Payer: MEDICAID

## 2025-05-20 ENCOUNTER — NURSE TRIAGE (OUTPATIENT)
Dept: CARE COORDINATION | Facility: CLINIC | Age: 30
End: 2025-05-20
Payer: MEDICAID

## 2025-05-20 LAB
ATRIAL RATE - MUSE: 73 BPM
DIASTOLIC BLOOD PRESSURE - MUSE: NORMAL MMHG
INTERPRETATION ECG - MUSE: NORMAL
P AXIS - MUSE: 74 DEGREES
PR INTERVAL - MUSE: 200 MS
QRS DURATION - MUSE: 86 MS
QT - MUSE: 390 MS
QTC - MUSE: 429 MS
R AXIS - MUSE: 21 DEGREES
SYSTOLIC BLOOD PRESSURE - MUSE: NORMAL MMHG
T AXIS - MUSE: 24 DEGREES
VENTRICULAR RATE- MUSE: 73 BPM

## 2025-05-20 NOTE — TELEPHONE ENCOUNTER
Triage Call:    Patient discharged 5/19/25 from hospital for otitis media. Patient was on antibiotics in the hospital and sent home with RX. Patient has been unable to start this today due to the pharmacy not having the medication until tomorrow. Patient states she now feels like she has fluid in her ear and pain is worse since leaving the hospital. Patient states she can put her chin to her chest and temperature is unknown. Patient states she does not have a thermometer but feels warm.  Patient also has dizziness, slight. Denies needing to hold onto objects when walking. Patient states the bone behind her ear is swollen and she feels like she has a fever. Also see patient outreach encounter dated 5/20/25.    Pt was advised of protocol recommendation/disposition of Go to ED NOW.     Patient stated understanding and will go back to ED.     Jessica Esposito RN 05/20/25 3:49 PM  [facility] - Ambulatory Care Management  Reason for Disposition   [1] Bony area of skull behind the ear is pink or swollen AND [2] fever     Patient states it is slightly swollen, feels like she has a fever.    Additional Information   Negative: [1] Stiff neck (can't touch chin to chest) AND [2] fever    Protocols used: Ear - Otitis Media Follow-up Call-A-

## 2025-05-20 NOTE — PROGRESS NOTES
[facility]: Transitions of Care Outreach  Chief Complaint   Patient presents with    Clinic Care Coordination - Post Hospital       Most Recent Admission Date: 5/18/2025   Most Recent Admission Diagnosis: Mastoiditis of left side - H70.92     Most Recent Discharge Date: 5/19/2025   Most Recent Discharge Diagnosis: Mastoiditis of left side - H70.92  Hypokalemia - E87.6  Preseptal cellulitis - L03.213  Acute suppurative otitis media of left ear without spontaneous rupture of tympanic membrane, recurrence not specified - H66.002  Iron deficiency anemia, unspecified iron deficiency anemia type - D50.9     Transitions of Care Assessment    Discharge Assessment  How are you doing now that you are home?: Patient states she is doing good. Patient states she feels like the pain is coming back since stopping the antibiotics. Patient has not been able to start the antibiotics due to pharmacy not having them in stock. Feels better since she was admitted but started to feel symptoms today. Feels like ear is blocked like there is fluid in it. Rates pain a 5/10.  How are your symptoms? (Red Flag symptoms escalate to triage hotline per guidelines): Worsening  Does the patient have their discharge instructions? : Yes  Does the patient have questions regarding their discharge instructions? : No  Were you started on any new medications or were there changes to any of your previous medications? : Yes  Does the patient have all of their medications?: No (see comment) (Patient stated they were not ready at the pharmacy. RN spoke to pharmacy.  They are out of the Augmentin at this time, the pharmacy did order it.  It will be ready tomorrow. Pharmacy did [place] they [place].)  Do you have questions regarding any of your medications? : Yes (see comment) (Patient is concerned about not being on the antibiotics and waiting. RN discussed going back to be reseen or contacting  her PCP office.)              See nurse triage encounter dated 5/20/25. Patient was advised to go back to ED based on symptoms. TCM call not completed due to recommendation from protocol to be seen in ED.  During call RN was disconnected from call as call was dropped.  RN did obtain new  and called back back to discuss recommendation further.  Total call time 1hr 34 min (includes calling [facility] and triage).     Follow up Plan          Future Appointments   Date Time Provider Department Center   8/27/2025  1:15 PM Kiara Mcgregor AuD [facility]   8/27/2025  2:15 PM Bharathi Baker MD [facility]       Outpatient Plan as outlined on AVS reviewed with patient.    For any urgent concerns, please contact our 24 hour nurse triage line: [phone] ([phone])       Jessica Esposito RN

## 2025-05-22 NOTE — TELEPHONE ENCOUNTER
FUTURE VISIT INFORMATION:  Appointment Date: 6/5/25  Appointment Time: 12:30PM    REFERRAL INFORMATION:  Referring By: Kimi Das PA   Referring Clinic: [facility]  Reason for Visit/Diagnosis: referred by- Kimi Das- for left otitis media w/ concern for mastoiditis from ED consult      NOTES STATUS DETAILS   OFFICE NOTE from referring provider Internal    OFFICE NOTE from other specialist Care Everywhere  [facility]   5/20/25: Belem Egan MD     HOSPITAL DISCHARGE SUMMARY/ ER visit Epic    Care Everywhere [facility]  5/18/25 - 5/19/25 ER notes  12/13/24 ER notes    [facility]   5/16/25  ER notes    [facility]    12[place]/23 ER notes   IMAGES *pertaining images & report*       CT, MRI, PET, NM, US, XRAYS, etc ... Req 5/22 [facility]  5/18/25 CT temporal  7[place]5/24 [place]    [facility]  [place]     Records Requested   05/22/2025 at 7:57 AM  [id number]   [place] [facility]   Outcome Faxed a request for 2023 CT image pushed to [facility]

## 2025-06-02 DIAGNOSIS — Z01.10 ENCOUNTER FOR HEARING TEST: Primary | ICD-10-CM

## 2025-06-05 ENCOUNTER — PRE VISIT (OUTPATIENT)
Dept: OTOLARYNGOLOGY | Facility: CLINIC | Age: 30
End: 2025-06-05